# Patient Record
Sex: FEMALE | Race: WHITE | NOT HISPANIC OR LATINO | Employment: FULL TIME | URBAN - METROPOLITAN AREA
[De-identification: names, ages, dates, MRNs, and addresses within clinical notes are randomized per-mention and may not be internally consistent; named-entity substitution may affect disease eponyms.]

---

## 2017-02-17 ENCOUNTER — OFFICE VISIT (OUTPATIENT)
Dept: FAMILY MEDICINE | Facility: CLINIC | Age: 32
End: 2017-02-17
Payer: COMMERCIAL

## 2017-02-17 VITALS
HEIGHT: 65 IN | WEIGHT: 130.3 LBS | BODY MASS INDEX: 21.71 KG/M2 | DIASTOLIC BLOOD PRESSURE: 69 MMHG | HEART RATE: 66 BPM | SYSTOLIC BLOOD PRESSURE: 99 MMHG | TEMPERATURE: 98.8 F

## 2017-02-17 DIAGNOSIS — Z86.59 HISTORY OF ANXIETY: ICD-10-CM

## 2017-02-17 DIAGNOSIS — Z97.5 IUD (INTRAUTERINE DEVICE) IN PLACE: ICD-10-CM

## 2017-02-17 DIAGNOSIS — F41.1 GENERALIZED ANXIETY DISORDER: ICD-10-CM

## 2017-02-17 DIAGNOSIS — Z12.4 SCREENING FOR CERVICAL CANCER: ICD-10-CM

## 2017-02-17 DIAGNOSIS — L70.9 ACNE, UNSPECIFIED ACNE TYPE: ICD-10-CM

## 2017-02-17 DIAGNOSIS — J45.20 MILD INTERMITTENT ASTHMA WITHOUT COMPLICATION: ICD-10-CM

## 2017-02-17 DIAGNOSIS — Z00.00 ROUTINE GENERAL MEDICAL EXAMINATION AT A HEALTH CARE FACILITY: Primary | ICD-10-CM

## 2017-02-17 PROCEDURE — 99395 PREV VISIT EST AGE 18-39: CPT | Performed by: FAMILY MEDICINE

## 2017-02-17 PROCEDURE — 36415 COLL VENOUS BLD VENIPUNCTURE: CPT | Performed by: FAMILY MEDICINE

## 2017-02-17 PROCEDURE — 80048 BASIC METABOLIC PNL TOTAL CA: CPT | Performed by: FAMILY MEDICINE

## 2017-02-17 PROCEDURE — G0145 SCR C/V CYTO,THINLAYER,RESCR: HCPCS | Performed by: FAMILY MEDICINE

## 2017-02-17 PROCEDURE — 87624 HPV HI-RISK TYP POOLED RSLT: CPT | Performed by: FAMILY MEDICINE

## 2017-02-17 RX ORDER — SPIRONOLACTONE 25 MG/1
25 TABLET ORAL DAILY
Qty: 30 TABLET | Refills: 11 | Status: SHIPPED | OUTPATIENT
Start: 2017-02-17 | End: 2017-10-12 | Stop reason: SINTOL

## 2017-02-17 RX ORDER — ALBUTEROL SULFATE 90 UG/1
2 AEROSOL, METERED RESPIRATORY (INHALATION) EVERY 6 HOURS PRN
Qty: 1 INHALER | Refills: 11 | Status: SHIPPED | OUTPATIENT
Start: 2017-02-17 | End: 2018-08-13

## 2017-02-17 NOTE — MR AVS SNAPSHOT
After Visit Summary   2/17/2017    Yeni Gaston    MRN: [id number]           Patient Information     Date Of Birth          1985        Visit Information        Provider Department      2/17/2017 4:00 PM Annie Guerrier MD [facility]        Today's Diagnoses     Routine general medical examination at a health care facility    -  1    Mild intermittent asthma without complication        Acne, unspecified acne type        IUD (intrauterine device) in place        Screening for cervical cancer        Generalized anxiety disorder        History of anxiety          Care Instructions    Mild intermittent asthma without complication  Comment: Plan: excercise triggers it  Albuterol four times daily as needed      (L70.9) Acne, unspecified acne type  Comment:   Plan: spirolactone once daily once daily\  BMP.can not continue when / if planning pregnancy      History of anxiety  lexapro helped but loss of libido  Able to cope well without current concerns    (Z97.5) IUD (intrauterine device) in place  Plan: Merina July 2014    Preventive Health Recommendations  Female Ages 26 - 39  Yearly exam:   See your health care provider every year in order to    Review health changes.     Discuss preventive care.      Review your medicines if you your doctor has prescribed any.    Until age 30: Get a Pap test every three years (more often if you have had an abnormal result).    After age 30: Talk to your doctor about whether you should have a Pap test every 3 years or have a Pap test with HPV screening every 5 years.   You do not need a Pap test if your uterus was removed (hysterectomy) and you have not had cancer.  You should be tested each year for STDs (sexually transmitted diseases), if you're at risk.   Talk to your provider about how often to have your cholesterol checked.  If you are at risk for diabetes, you should have a diabetes test (fasting glucose).  Shots: Get a flu shot each year. Get  "a tetanus shot every 10 years.   Nutrition:     Eat at least 5 servings of fruits and vegetables each day.    Eat whole-grain bread, whole-wheat pasta and brown rice instead of white grains and rice.    Talk to your provider about Calcium and Vitamin D.     Lifestyle    Exercise at least 150 minutes a week (30 minutes a day, 5 days of the week). This will help you control your weight and prevent disease.    Limit alcohol to one drink per day.    No smoking.     Wear sunscreen to prevent skin cancer.    See your dentist every six months for an exam and cleaning.          Follow-ups after your visit        Future tests that were ordered for you today     Open Future Orders        Priority Expected Expires Ordered    **Basic metabolic panel FUTURE 14d Routine 2/24/2017 3/3/2017 2/17/2017            Who to contact     If you have questions or need follow up information about today's clinic visit or your schedule please contact [facility] directly at [phone].  Normal or non-critical lab and imaging results will be communicated to you by Drafthart, letter or phone within 4 business days after the clinic has received the results. If you do not hear from us within 7 days, please contact the clinic through Drafthart or phone. If you have a critical or abnormal lab result, we will notify you by phone as soon as possible.  Submit refill requests through aSmallWorld or call your pharmacy and they will forward the refill request to us. Please allow 3 business days for your refill to be completed.          Additional Information About Your Visit        Draftharoroeco Information     aSmallWorld lets you send messages to your doctor, view your test results, renew your prescriptions, schedule appointments and more. To sign up, go to [email or web address] . Click on \"Log in\" on the left side of the screen, which will take you to the Welcome page. Then click on \"Sign up Now\" on the right side of the page.     You will be asked to " "enter the access code listed below, as well as some personal information. Please follow the directions to create your username and password.     Your access code is: [email or web address]  Expires: 2017  4:46 PM     Your access code will  in 90 days. If you need help or a new code, please call your [facility] clinic or [phone].        Care EveryWhere ID     This is your Care EveryWhere ID. This could be used by other organizations to access your [facility] medical records  [id number]        Your Vitals Were     Pulse Temperature Height BMI (Body Mass Index)          66 98.8  F (37.1  C) (Oral) 5' 5\" (1.651 m) 21.68 kg/m2         Blood Pressure from Last 3 Encounters:   17 99/69   14 94/60   14 94/52    Weight from Last 3 Encounters:   17 130 lb 4.8 oz (59.1 kg)   14 128 lb (58.1 kg)   14 128 lb 3.2 oz (58.2 kg)              We Performed the Following     HPV High Risk Types DNA Cervical     Pap imaged thin layer screen with HPV - recommended age 30 - 65 years (select HPV order below)          Today's Medication Changes          These changes are accurate as of: 17  4:46 PM.  If you have any questions, ask your nurse or doctor.               Start taking these medicines.        Dose/Directions    albuterol 108 (90 BASE) MCG/ACT Inhaler   Commonly known as:  PROAIR HFA/PROVENTIL HFA/VENTOLIN HFA   Used for:  Mild intermittent asthma without complication   Started by:  Annie Guerrier MD        Dose:  2 puff   Inhale 2 puffs into the lungs every 6 hours as needed for shortness of breath / dyspnea or wheezing   Quantity:  1 Inhaler   Refills:  11       spironolactone 25 MG tablet   Commonly known as:  ALDACTONE   Used for:  Acne, unspecified acne type   Started by:  Annie Guerrier MD        Dose:  25 mg   Take 1 tablet (25 mg) by mouth daily   Quantity:  30 tablet   Refills:  11            Where to get your medicines      These medications were sent to [facility] [address] [place] " [facility] - [place], [place] - [address] [facility]  [address] [facility] [address]     Phone:  [phone]     albuterol 108 (90 BASE) MCG/ACT Inhaler    spironolactone 25 MG tablet                Primary Care Provider Office Phone # Fax #    Annie Candi Guerrier [phone]       [facility] [address] [facility] [address]        Thank you!     Thank you for choosing [facility]  for your care. Our goal is always to provide you with excellent care. Hearing back from our patients is one way we can continue to improve our services. Please take a few minutes to complete the written survey that you may receive in the mail after your visit with us. Thank you!             Your Updated Medication List - Protect others around you: Learn how to safely use, store and throw away your medicines at www.disposemymeds.org.          This list is accurate as of: 2/17/17  4:46 PM.  Always use your most recent med list.                   Brand Name Dispense Instructions for use    albuterol 108 (90 BASE) MCG/ACT Inhaler    PROAIR HFA/PROVENTIL HFA/VENTOLIN HFA    1 Inhaler    Inhale 2 puffs into the lungs every 6 hours as needed for shortness of breath / dyspnea or wheezing       escitalopram 10 MG tablet    LEXAPRO    90 tablet    Take 1 tablet (10 mg) by mouth daily       Fish Oil Oil      daily.       MIRENA (52 MG) 20 MCG/24HR IUD   Generic drug:  levonorgestrel      1 each (20 mcg) by Intrauterine route once       multivitamin per tablet      Take 1 tablet by mouth daily.       NASACORT AQ 55 MCG/ACT nasal inhaler   Generic drug:  triamcinolone     1 Inhaler    Spray 2 sprays into both nostrils daily       spironolactone 25 MG tablet    ALDACTONE    30 tablet    Take 1 tablet (25 mg) by mouth daily

## 2017-02-17 NOTE — LETTER
[facility]  [address]  [facility] [address]  [phone]      February 24, 2017    Yeni Gaston  [address]  [place] [address]    Dear Yeni,  We are happy to inform you that your PAP smear result from 02/17/17 is normal.  We are now able to do a follow up test on PAP smears. The DNA test is for HPV (Human Papilloma Virus). Cervical cancer is closely linked with certain types of HPV. Your result showed no evidence of high risk HPV.  Therefore we recommend you return in 3 years for your next pap smear and HPV test.  You will still need to return to the clinic every year for an annual exam and other preventive tests.  Please contact the clinic with any questions.  Sincerely,  Annie Guerrier MD/[facility]

## 2017-02-17 NOTE — NURSING NOTE
"Chief Complaint   Patient presents with     Physical     not fasting     BP 99/69  Pulse 66  Temp 98.8  F (37.1  C) (Oral)  Ht 5' 5\" (1.651 m)  Wt 130 lb 4.8 oz (59.1 kg)  BMI 21.68 kg/m2 Estimated body mass index is 21.68 kg/(m^2) as calculated from the following:    Height as of this encounter: 5' 5\" (1.651 m).    Weight as of this encounter: 130 lb 4.8 oz (59.1 kg).  BP completed using cuff size: regular       Health Maintenance due pending provider review:  Pap Smear due  6/2017    PAP--Possibly completing today, per Provider review      Flory Aparicio CMA  "

## 2017-02-17 NOTE — LETTER
My Asthma Action Plan  Name: Yeni Gaston   YOB: 1985  Date: 2/17/2017   My doctor: Annie Guerrier   My clinic: [facility]      My Control Medicine: not needed        Dose:   My Rescue Medicine: Albuterol (Proair/Ventolin/Proventil) HFA        Dose: prior excercise    My Asthma Severity: intermittent  Avoid your asthma triggers: exercise or sports        GREEN ZONE   Good Control    I feel good    No cough or wheeze    Can work, sleep and play without asthma symptoms       Take your asthma control medicine every day.     1. If exercise triggers your asthma, take your rescue medication    15 minutes before exercise or sports, and    During exercise if you have asthma symptoms  2. Spacer to use with inhaler: If you have a spacer, make sure to use it with your inhaler             YELLOW ZONE Getting Worse  I have ANY of these:    I do not feel good    Cough or wheeze    Chest feels tight    Wake up at night   1. Keep taking your Green Zone medications  2. Start taking your rescue medicine:    every 20 minutes for up to 1 hour. Then every 4 hours for 24-48 hours.  3. If you stay in the Yellow Zone for more than 12-24 hours, contact your doctor.  4. If you do not return to the Green Zone in 12-24 hours or you get worse, start taking your oral steroid medicine if prescribed by your provider.           RED ZONE Medical Alert - Get Help  I have ANY of these:    I feel awful    Medicine is not helping    Breathing getting harder    Trouble walking or talking    Nose opens wide to breathe       1. Take your rescue medicine NOW  2. If your provider has prescribed an oral steroid medicine, start taking it NOW  3. Call your doctor NOW  4. If you are still in the Red Zone after 20 minutes and you have not reached your doctor:    Take your rescue medicine again and    Call 911 or go to the emergency room right away    See your regular doctor within 2 weeks of an Emergency Room or Urgent Care  visit for follow-up treatment.        The above medication may be given at school or day care?: N/A (Adult Patient)  Child can carry and use inhaler(s) at school with approval of school nurse?: N/A (Adult Patient)    Electronically signed by: Annie Guerrier, February 17, 2017    Annual Reminders:  Meet with Asthma Educator,  Flu Shot in the Fall, consider Pneumonia Vaccination for patients with asthma (aged 19 and older).    Pharmacy:    [id number] - [place], [place] - [address]  [facility] FAX ONLY - [place], [place] [facility] - [place], [place] - [address]                    Asthma Triggers  How To Control Things That Make Your Asthma Worse    Triggers are things that make your asthma worse.  Look at the list below to help you find your triggers and what you can do about them.  You can help prevent asthma flare-ups by staying away from your triggers.      Trigger                                                          What you can do   Cigarette Smoke  Tobacco smoke can make asthma worse. Do not allow smoking in your home, car or around you.  Be sure no one smokes at a child s day care or school.  If you smoke, ask your health care provider for ways to help you quit.  Ask family members to quit too.  Ask your health care provider for a referral to Quit Plan to help you quit smoking, or call 4-405-378-PLAN.     Colds, Flu, Bronchitis  These are common triggers of asthma. Wash your hands often.  Don t touch your eyes, nose or mouth.  Get a flu shot every year.     Dust Mites  These are tiny bugs that live in cloth or carpet. They are too small to see. Wash sheets and blankets in hot water every week.   Encase pillows and mattress in dust mite proof covers.  Avoid having carpet if you can. If you have carpet, vacuum weekly.   Use a dust mask and HEPA vacuum.   Pollen and Outdoor Mold  Some people are allergic to trees, grass, or weed pollen,  or molds. Try to keep your windows closed.  Limit time out doors when pollen count is high.   Ask you health care provider about taking medicine during allergy season.     Animal Dander  Some people are allergic to skin flakes, urine or saliva from pets with fur or feathers. Keep pets with fur or feathers out of your home.    If you can t keep the pet outdoors, then keep the pet out of your bedroom.  Keep the bedroom door closed.  Keep pets off cloth furniture and away from stuffed toys.     Mice, Rats, and Cockroaches  Some people are allergic to the waste from these pests.   Cover food and garbage.  Clean up spills and food crumbs.  Store grease in the refrigerator.   Keep food out of the bedroom.   Indoor Mold  This can be a trigger if your home has high moisture. Fix leaking faucets, pipes, or other sources of water.   Clean moldy surfaces.  Dehumidify basement if it is damp and smelly.   Smoke, Strong Odors, and Sprays  These can reduce air quality. Stay away from strong odors and sprays, such as perfume, powder, hair spray, paints, smoke incense, paint, cleaning products, candles and new carpet.   Exercise or Sports  Some people with asthma have this trigger. Be active!  Ask your doctor about taking medicine before sports or exercise to prevent symptoms.    Warm up for 5-10 minutes before and after sports or exercise.     Other Triggers of Asthma  Cold air:  Cover your nose and mouth with a scarf.  Sometimes laughing or crying can be a trigger.  Some medicines and food can trigger asthma.

## 2017-02-17 NOTE — PATIENT INSTRUCTIONS
Mild intermittent asthma without complication  Comment: Plan: excercise triggers it  Albuterol four times daily as needed      (L70.9) Acne, unspecified acne type  Comment:   Plan: spirolactone once daily once daily\  BMP.can not continue when / if planning pregnancy      History of anxiety  lexapro helped but loss of libido  Able to cope well without current concerns    (Z97.5) IUD (intrauterine device) in place  Plan: Jamison July 2014    Preventive Health Recommendations  Female Ages 26 - 39  Yearly exam:   See your health care provider every year in order to    Review health changes.     Discuss preventive care.      Review your medicines if you your doctor has prescribed any.    Until age 30: Get a Pap test every three years (more often if you have had an abnormal result).    After age 30: Talk to your doctor about whether you should have a Pap test every 3 years or have a Pap test with HPV screening every 5 years.   You do not need a Pap test if your uterus was removed (hysterectomy) and you have not had cancer.  You should be tested each year for STDs (sexually transmitted diseases), if you're at risk.   Talk to your provider about how often to have your cholesterol checked.  If you are at risk for diabetes, you should have a diabetes test (fasting glucose).  Shots: Get a flu shot each year. Get a tetanus shot every 10 years.   Nutrition:     Eat at least 5 servings of fruits and vegetables each day.    Eat whole-grain bread, whole-wheat pasta and brown rice instead of white grains and rice.    Talk to your provider about Calcium and Vitamin D.     Lifestyle    Exercise at least 150 minutes a week (30 minutes a day, 5 days of the week). This will help you control your weight and prevent disease.    Limit alcohol to one drink per day.    No smoking.     Wear sunscreen to prevent skin cancer.    See your dentist every six months for an exam and cleaning.

## 2017-02-17 NOTE — PROGRESS NOTES
SUBJECTIVE:     CC: Yeni Gaston is an 31 year old woman who presents for preventive health visit.     Milk /cheese- constipated and while consuming the products stabbing knives  She is wondering if now lactose intolerance due to IUD-  meirina or 2 are unreslted  Acne since merina, breaks out in chin area. Local medications- cause sensitivity    Excercise induced asthma  Sister has excercise induced and allergies triggered asthma    Healthy Habits:    Do you get at least three servings of calcium containing foods daily (dairy, green leafy vegetables, etc.)? yes    Amount of exercise or daily activities, outside of work: 3-4 day(s) per week    Problems taking medications regularly No    Medication side effects: No    Have you had an eye exam in the past two years? yes    Do you see a dentist twice per year? yes    Do you have sleep apnea, excessive snoring or daytime drowsiness?no        SOB- when running      Duration: last few years getting worse lately     Description (location/character/radiation): SOB while on treadmill - starts after 5 min     Intensity:  moderate    Accompanying signs and symptoms: SOB, face gets tingly and twitches , ears ache and ring      History (similar episodes/previous evaluation): None    Precipitating or alleviating factors: None    Therapies tried and outcome: not really          Today's PHQ-2 Score:   PHQ-2 ( 1999 Pfizer) 2/17/2017 7/16/2014   Q1: Little interest or pleasure in doing things 0 0   Q2: Feeling down, depressed or hopeless 0 0   PHQ-2 Score 0 0       Abuse: Current or Past(Physical, Sexual or Emotional)- No  Do you feel safe in your environment - Yes    Social History   Substance Use Topics     Smoking status: Never Smoker     Smokeless tobacco: Never Used     Alcohol use Yes      Comment: rarely     The patient does not drink >3 drinks per day nor >7 drinks per week.    No results for input(s): CHOL, HDL, LDL, TRIG, CHOLHDLRATIO, NHDL in the last 12158  "hours.    Reviewed orders with patient.  Reviewed health maintenance and updated orders accordingly - Yes    Mammo Decision Support:  Mammogram not appropriate for this patient based on age.    Pertinent mammograms are reviewed under the imaging tab.  History of abnormal Pap smear: NO - age 30- 65 PAP every 3 years recommended  All Histories reviewed and updated in Epic.      ROS: situational stress prior to wedding  Weaned herself off of medications  Denies any on going anxiety- does not like medications  For longetrm  lexapro helped with anxiety but loss of libdio was a big side effects. Does not think anxiety is current concerb  C: NEGATIVE for fever, chills, change in weight  I: NEGATIVE for worrisome rashes, moles or lesions  E: NEGATIVE for vision changes or irritation  ENT: NEGATIVE for ear, mouth and throat problems  R: NEGATIVE for significant cough or SOB  B: NEGATIVE for masses, tenderness or discharge  CV: NEGATIVE for chest pain, palpitations or peripheral edema  GI: NEGATIVE for nausea, abdominal pain, heartburn, or change in bowel habits  : NEGATIVE for unusual urinary or vaginal symptoms. Periods are regular.  M: NEGATIVE for significant arthralgias or myalgia  N: NEGATIVE for weakness, dizziness or paresthesias  P: NEGATIVE for changes in mood or affect    Problem list, Medication list, Allergies, and Medical/Social/Surgical histories reviewed in [facility] and updated as appropriate.  OBJECTIVE:     BP 99/69  Pulse 66  Temp 98.8  F (37.1  C) (Oral)  Ht 5' 5\" (1.651 m)  Wt 130 lb 4.8 oz (59.1 kg)  BMI 21.68 kg/m2  EXAM:   Wt Readings from Last 5 Encounters:   02/17/17 130 lb 4.8 oz (59.1 kg)   07/28/14 128 lb (58.1 kg)   07/16/14 128 lb 3.2 oz (58.2 kg)   06/17/14 134 lb 14.4 oz (61.2 kg)   06/11/14 132 lb 14.4 oz (60.3 kg)     GENERAL: healthy, alert and no distress  EYES: Eyes grossly normal to inspection, PERRL and conjunctivae and sclerae normal  HENT: ear canals and TM's normal, nose and mouth " without ulcers or lesions  NECK: no adenopathy, no asymmetry, masses, or scars and thyroid normal to palpation  RESP: lungs clear to auscultation - no rales, rhonchi or wheezes  BREAST: normal without masses, tenderness or nipple discharge and no palpable axillary masses or adenopathy  CV: regular rate and rhythm, normal S1 S2, no S3 or S4, no murmur, click or rub, no peripheral edema and peripheral pulses strong  ABDOMEN: soft, nontender, no hepatosplenomegaly, no masses and bowel sounds normal   (female): normal female external genitalia, normal urethral meatus, vaginal mucosa pink, moist, well rugated, and normal cervix/adnexa/uterus without masses or discharge. IUD in place  MS: no gross musculoskeletal defects noted, no edema  SKIN: no suspicious lesions or rashes  NEURO: Normal strength and tone, mentation intact and speech normal  PSYCH: mentation appears normal, affect normal/bright    ASSESSMENT/PLAN:     (Z00.00) Routine general medical examination at a health care facility  (primary encounter diagnosis)  Plan: Please see patient instructions     (J45.20) Mild intermittent asthma without complication  Comment: Plan: excercise triggers it  Albuterol four times daily as needed      (L70.9) Acne, unspecified acne type  Comment:   Plan: spirolactone once daily once daily\  BMP.  can not continue when / if planning pregnancy  Potential medication side effects were discussed with the patient; let me know if any occur.        History of anxiety  lexapro helped but loss of libido  Able to cope well without current concerns    (Z97.5) IUD (intrauterine device) in place  Plan: Jamison July 2014    (Z12.4) Screening for cervical cancer  Plan: HPV High Risk Types DNA Cervical, Pap imaged         thin layer screen with HPV - recommended age 30        - 65 years (select HPV order below)              COUNSELING:   Reviewed preventive health counseling, as reflected in patient instructions       Regular exercise        "Healthy diet/nutrition         reports that she has never smoked. She has never used smokeless tobacco.    Estimated body mass index is 21.68 kg/(m^2) as calculated from the following:    Height as of this encounter: 5' 5\" (1.651 m).    Weight as of this encounter: 130 lb 4.8 oz (59.1 kg).       Counseling Resources:  ATP IV Guidelines  Pooled Cohorts Equation Calculator  Breast Cancer Risk Calculator  FRAX Risk Assessment  ICSI Preventive Guidelines  Dietary Guidelines for Americans, 2010  USDA's MyPlate  ASA Prophylaxis  Lung CA Screening    Annie Guerrier MD  [facility]  "

## 2017-02-20 LAB
ANION GAP SERPL CALCULATED.3IONS-SCNC: 10 MMOL/L (ref 3–14)
BUN SERPL-MCNC: 10 MG/DL (ref 7–30)
CALCIUM SERPL-MCNC: 9.5 MG/DL (ref 8.5–10.1)
CHLORIDE SERPL-SCNC: 105 MMOL/L (ref 94–109)
CO2 SERPL-SCNC: 23 MMOL/L (ref 20–32)
CREAT SERPL-MCNC: 0.69 MG/DL (ref 0.52–1.04)
GFR SERPL CREATININE-BSD FRML MDRD: NORMAL ML/MIN/1.7M2
GLUCOSE SERPL-MCNC: 77 MG/DL (ref 70–99)
POTASSIUM SERPL-SCNC: 4.1 MMOL/L (ref 3.4–5.3)
SODIUM SERPL-SCNC: 138 MMOL/L (ref 133–144)

## 2017-02-22 LAB
COPATH REPORT: NORMAL
PAP: NORMAL

## 2017-02-24 LAB
FINAL DIAGNOSIS: NORMAL
HPV HR 12 DNA CVX QL NAA+PROBE: NEGATIVE
HPV16 DNA SPEC QL NAA+PROBE: NEGATIVE
HPV18 DNA SPEC QL NAA+PROBE: NEGATIVE
SPECIMEN DESCRIPTION: NORMAL

## 2017-02-27 NOTE — PROGRESS NOTES
Send lab & letter    -Kidney function is normal (Cr, GFR), Sodium is normal, Potassium is normal, Calcium is normal, Glucose is normal (diabetes screening test).     Please keep us posted with questions or concerns .      Best Regards,    Annie Guerrier MD  [facility]  [phone]    ;

## 2017-06-06 ENCOUNTER — VIRTUAL VISIT (OUTPATIENT)
Dept: FAMILY MEDICINE | Facility: OTHER | Age: 32
End: 2017-06-06

## 2017-06-06 NOTE — PROGRESS NOTES
"Date:   Clinician: Lorna Burns  Clinician NPI: [id number]  Patient: Yeni West  Patient : 1985  Patient Address: [address] [place] [address]  Patient Phone: [phone]  Visit Protocol: UTI  Patient Summary:  Yeni is a 31 year old ( : 1985 ) female who initiated a Visit for a presumed bladder infection. When asked the question \"Please sign me up to receive news, health information and promotions. \", Yeni responded \"No\".   A synchronous visit is necessary because the patient reported the following abnormal symptoms:   She has used antibiotics for her current symptoms    Her symptoms began 2 days ago and consist of urinary frequency, foul smelling urine, urgency, and dysuria.   Symptom Details   Urinary Frequency: Several times each hour    She denies flank pain, hematuria, vaginal discharge, abdominal pain, recent antibiotic use, nausea, loss of appetite, chills, fever, urinary incontinence, hesitation, and vomiting. Yeni has never had kidney stones. She has not been hospitalized, been a patient in a nursing home, or had a catheter in the past two weeks. She denies risk factors for sexually transmitted infections.   Yeni has had one (1) UTI in the past 12 months. Her most recent bladder infection was not within the last 4 weeks. Her current symptoms are similar to the previous UTI symptoms. She took TMP/Sulfa for her last infection and found it to be effective.    Yeni typically gets yeast infections when she takes antibiotics.  She states she is not pregnant and denies breastfeeding. She no longer menstruates.   She does NOT smoke or use smokeless tobacco.  MEDICATIONS:  No current medications , ALLERGIES:  NKDA   Clinician Response:  Dear Yeni,  Based on the information you have provided, you likely have a bladder infection, also called an acute urinary tract infection (UTI).   To treat your infection, I am prescribing:   Bactrim DS. Swallow one (1) tablet " twice a day for 3 days to treat your bladder infection. Continue taking the tablets even if you feel better before all the medication is gone. There is no refill with this prescription.   Antibiotic selections by the provider are based on safety and effectiveness. You may or may not be prescribed the same medication that you took for your last bladder infection.   Some people develop allergies to antibiotics. If you notice a new rash, significant swelling, or difficulty breathing, stop the medication immediately and go into a clinic for physical evaluation.   To help treat your current UTI and prevent future occurrences, remember to:     Drink 8-10, 8-ounce glasses of water daily.    Urinate after sexual intercourse.    Wipe front to back after using the bathroom.     Some women may develop a yeast infection as a side effect of taking antibiotics. Because you noted that you typically get yeast infections when you are taking antibiotics, I am also prescribing:   Fluconazole (Diflucan) 150 mg oral tablet. Take this medication only if you notice symptoms of a yeast infection (vaginal discharge that is white, thick, and odorless). There are no refills with this prescription.   You should visit a clinic for a follow-up visit if your symptoms do not improve in 1-2 days or if you experience another urinary tract infection soon after completing this treatment.  If you become pregnant during this course of treatment, stop taking the medication and contact your primary care provider.   Diagnosis: Acute Uncomplicated Bladder Infection  Diagnosis ICD: N39.0  Triage Notes: Pt name and  verified. Patient states she is not currently on antibiotics and has not been on antibiotics for a long time.   Synchronous Triage: phone, status: completed, duration: 124 seconds  Prescription: sulfamethoxazole-TMP DS (Bactrim DS) 800-160mg oral tablet 6 tablets, 3 days supply. Take one tablet by mouth two times a day for 3 days. Refills: 0,  Refill as needed: no, Allow substitutions: yes  Prescription: fluconazole (Diflucan) 150mg oral tablet 1 tablet, 1 days supply. Take one tablet by mouth one time a day for 1 day. Refills: 0, Refill as needed: no, Allow substitutions: yes  Pharmacy: [facility] [id number] [facility] - [phone] - [address], [place] [address]

## 2017-10-12 ENCOUNTER — OFFICE VISIT (OUTPATIENT)
Dept: FAMILY MEDICINE | Facility: CLINIC | Age: 32
End: 2017-10-12
Payer: COMMERCIAL

## 2017-10-12 VITALS
WEIGHT: 124.2 LBS | DIASTOLIC BLOOD PRESSURE: 68 MMHG | HEART RATE: 100 BPM | TEMPERATURE: 98.8 F | BODY MASS INDEX: 20.69 KG/M2 | RESPIRATION RATE: 16 BRPM | OXYGEN SATURATION: 99 % | HEIGHT: 65 IN | SYSTOLIC BLOOD PRESSURE: 96 MMHG

## 2017-10-12 DIAGNOSIS — T78.40XA ALLERGIC STATE, INITIAL ENCOUNTER: ICD-10-CM

## 2017-10-12 DIAGNOSIS — J01.01 ACUTE RECURRENT MAXILLARY SINUSITIS: Primary | ICD-10-CM

## 2017-10-12 PROCEDURE — 99213 OFFICE O/P EST LOW 20 MIN: CPT | Performed by: PHYSICIAN ASSISTANT

## 2017-10-12 RX ORDER — TRIAMCINOLONE ACETONIDE 55 UG/1
2 SPRAY, METERED NASAL DAILY
Qty: 1 BOTTLE | Refills: 11 | Status: ON HOLD | OUTPATIENT
Start: 2017-10-12 | End: 2020-09-08

## 2017-10-12 NOTE — NURSING NOTE
"Chief Complaint   Patient presents with     Sinus Problem     x 5 days     BP 96/68  Pulse 100  Temp 98.8  F (37.1  C) (Oral)  Resp 16  Ht 5' 5\" (1.651 m)  Wt 124 lb 3.2 oz (56.3 kg)  SpO2 99%  BMI 20.67 kg/m2 Estimated body mass index is 20.67 kg/(m^2) as calculated from the following:    Height as of this encounter: 5' 5\" (1.651 m).    Weight as of this encounter: 124 lb 3.2 oz (56.3 kg).  Medication Reconciliation: complete      Health Maintenance due pending provider review:  NONE    n/a    Susanne Peter CMA  "

## 2017-10-12 NOTE — MR AVS SNAPSHOT
After Visit Summary   10/12/2017    Yeni Gaston    MRN: [id number]           Patient Information     Date Of Birth          1985        Visit Information        Provider Department      10/12/2017 8:20 AM Pieter Augustin PA-C [facility]        Today's Diagnoses     Acute recurrent maxillary sinusitis    -  1    Allergic state, initial encounter           Follow-ups after your visit        Additional Services     ALLERGY/ASTHMA ADULT REFERRAL       Your provider has referred you to: [facility]: [facility] [phone]   [email or web address]    Please be aware that coverage of these services is subject to the terms and limitations of your health insurance plan.  Call member services at your health plan with any benefit or coverage questions.      Please bring the following with you to your appointment:    (1) Any X-Rays, CTs or MRIs which have been performed.  Contact the facility where they were done to arrange for  prior to your scheduled appointment.    (2) List of current medications  (3) This referral request   (4) Any documents/labs given to you for this referral                  Who to contact     If you have questions or need follow up information about today's clinic visit or your schedule please contact [facility] directly at [phone].  Normal or non-critical lab and imaging results will be communicated to you by MyChart, letter or phone within 4 business days after the clinic has received the results. If you do not hear from us within 7 days, please contact the clinic through MyChart or phone. If you have a critical or abnormal lab result, we will notify you by phone as soon as possible.  Submit refill requests through [employer] or call your pharmacy and they will forward the refill request to us. Please allow 3 business days for your refill to be completed.          Additional Information About  "Your Visit        [employer] Information     goTenna lets you send messages to your doctor, view your test results, renew your prescriptions, schedule appointments and more. To sign up, go to [email or web address] . Click on \"Log in\" on the left side of the screen, which will take you to the Welcome page. Then click on \"Sign up Now\" on the right side of the page.     You will be asked to enter the access code listed below, as well as some personal information. Please follow the directions to create your username and password.     Your access code is: [email or web address]  Expires: 1/10/2018  8:37 AM     Your access code will  in 90 days. If you need help or a new code, please call your [facility] clinic or [phone].        Care EveryWhere ID     This is your Care EveryWhere ID. This could be used by other organizations to access your [facility] medical records  [id number]        Your Vitals Were     Pulse Temperature Respirations Height Pulse Oximetry BMI (Body Mass Index)    100 98.8  F (37.1  C) (Oral) 16 5' 5\" (1.651 m) 99% 20.67 kg/m2       Blood Pressure from Last 3 Encounters:   10/12/17 96/68   17 99/69   14 94/60    Weight from Last 3 Encounters:   10/12/17 124 lb 3.2 oz (56.3 kg)   17 130 lb 4.8 oz (59.1 kg)   14 128 lb (58.1 kg)              We Performed the Following     ALLERGY/ASTHMA ADULT REFERRAL          Today's Medication Changes          These changes are accurate as of: 10/12/17  8:37 AM.  If you have any questions, ask your nurse or doctor.               Start taking these medicines.        Dose/Directions    amoxicillin-clavulanate 875-125 MG per tablet   Commonly known as:  AUGMENTIN   Used for:  Acute recurrent maxillary sinusitis   Started by:  Pieter Augustin PA-C        Dose:  1 tablet   Take 1 tablet by mouth 2 times daily   Quantity:  20 tablet   Refills:  0         These medicines have changed or have updated prescriptions.        Dose/Directions    * " NASACORT AQ 55 MCG/ACT nasal inhaler   This may have changed:  Another medication with the same name was added. Make sure you understand how and when to take each.   Generic drug:  triamcinolone   Changed by:  Annie Guerrier MD        Dose:  2 spray   Spray 2 sprays into both nostrils daily   Quantity:  1 Inhaler   Refills:  11       * triamcinolone 55 MCG/ACT Inhaler   Commonly known as:  NASACORT AQ   This may have changed:  You were already taking a medication with the same name, and this prescription was added. Make sure you understand how and when to take each.   Used for:  Allergic state, initial encounter   Changed by:  Pieter Augustin PA-C        Dose:  2 spray   Spray 2 sprays into both nostrils daily   Quantity:  1 Bottle   Refills:  11       * Notice:  This list has 2 medication(s) that are the same as other medications prescribed for you. Read the directions carefully, and ask your doctor or other care provider to review them with you.      Stop taking these medicines if you haven't already. Please contact your care team if you have questions.     spironolactone 25 MG tablet   Commonly known as:  ALDACTONE   Stopped by:  Pieter Augustin PA-C                Where to get your medicines      These medications were sent to [facility] [address] [place] - [place], [place] - [address]  [address] [facility] [address]     Phone:  [phone]     triamcinolone 55 MCG/ACT Inhaler         Some of these will need a paper prescription and others can be bought over the counter.  Ask your nurse if you have questions.     Bring a paper prescription for each of these medications     amoxicillin-clavulanate 875-125 MG per tablet                Primary Care Provider Office Phone # Fax #    Annie Guerrier [phone] [address] [facility] [address]        Equal Access to Services     YULIANA DONALD AH: jesus López qaybta  arianna alcocer chiragaristeo dora patterson ah. [facility] [phone].    ATENCIÓN: Si layne saldivar, tiene a berry disposición servicios gratuitos de asistencia lingüística. Jacque al [phone].    We comply with applicable federal civil rights laws and [place] laws. We do not discriminate on the basis of race, color, national origin, age, disability, sex, sexual orientation, or gender identity.            Thank you!     Thank you for choosing [facility]  for your care. Our goal is always to provide you with excellent care. Hearing back from our patients is one way we can continue to improve our services. Please take a few minutes to complete the written survey that you may receive in the mail after your visit with us. Thank you!             Your Updated Medication List - Protect others around you: Learn how to safely use, store and throw away your medicines at www.disposemymeds.org.          This list is accurate as of: 10/12/17  8:37 AM.  Always use your most recent med list.                   Brand Name Dispense Instructions for use Diagnosis    albuterol 108 (90 BASE) MCG/ACT Inhaler    PROAIR HFA/PROVENTIL HFA/VENTOLIN HFA    1 Inhaler    Inhale 2 puffs into the lungs every 6 hours as needed for shortness of breath / dyspnea or wheezing    Mild intermittent asthma without complication       amoxicillin-clavulanate 875-125 MG per tablet    AUGMENTIN    20 tablet    Take 1 tablet by mouth 2 times daily    Acute recurrent maxillary sinusitis       Fish Oil Oil      daily.        MIRENA (52 MG) 20 MCG/24HR IUD   Generic drug:  levonorgestrel      1 each (20 mcg) by Intrauterine route once        multivitamin per tablet      Take 1 tablet by mouth daily.        * NASACORT AQ 55 MCG/ACT nasal inhaler   Generic drug:  triamcinolone     1 Inhaler    Spray 2 sprays into both nostrils daily        * triamcinolone 55 MCG/ACT Inhaler    NASACORT AQ    1 Bottle    Spray 2 sprays into both  nostrils daily    Allergic state, initial encounter       * Notice:  This list has 2 medication(s) that are the same as other medications prescribed for you. Read the directions carefully, and ask your doctor or other care provider to review them with you.

## 2017-10-12 NOTE — PROGRESS NOTES
"  SUBJECTIVE:   Yeni Gaston is a 31 year old female who presents to clinic today for the following health issues:      RESPIRATORY SYMPTOMS      Duration: 5 days    Description  nasal congestion, rhinorrhea, sore throat, facial pain/pressure, cough, chills, ear pain both, headache, fatigue/malaise and stomach ache    Severity: moderate    Accompanying signs and symptoms: None    History (predisposing factors):  none    Precipitating or alleviating factors: worse at night when laying down  Therapies tried and outcome:  rest and fluids oral decongestant antihistamine, with limited improvement          Problem list and histories reviewed & adjusted, as indicated.  Additional history: 32 y/o female c/o not feeling well for the last 5 days.  Admits to the above symptoms with sinus congestion being the worst. Describes as sinus infection mixed with flu.  She is starting to feel [place] today.      She has long history or allergies and recurrent sinus infections.  She has never had allergy testing, but she is interested.    BP Readings from Last 3 Encounters:   10/12/17 96/68   02/17/17 99/69   07/28/14 94/60    Wt Readings from Last 3 Encounters:   10/12/17 124 lb 3.2 oz (56.3 kg)   02/17/17 130 lb 4.8 oz (59.1 kg)   07/28/14 128 lb (58.1 kg)                      Reviewed and updated as needed this visit by clinical staffAllergies       Reviewed and updated as needed this visit by Provider         ROS:  Constitutional, HEENT, cardiovascular, pulmonary, gi and gu systems are negative, except as otherwise noted.      OBJECTIVE:   BP 96/68  Pulse 100  Temp 98.8  F (37.1  C) (Oral)  Resp 16  Ht 5' 5\" (1.651 m)  Wt 124 lb 3.2 oz (56.3 kg)  SpO2 99%  BMI 20.67 kg/m2  Body mass index is 20.67 kg/(m^2).  GENERAL: alert and no distress  EYES: Eyes grossly normal to inspection  HENT: b/l TM dullness with loss of light reflux nasal mucosa is erythematous and edematous   NECK: no adenopathy, no asymmetry, masses, or scars and " thyroid normal to palpation  RESP: lungs clear to auscultation - no rales, rhonchi or wheezes  CV: regular rate and rhythm, normal S1 S2, no S3 or S4, no murmur, click or rub, no peripheral edema and peripheral pulses strong  PSYCH: mentation appears normal, affect normal/bright    Diagnostic Test Results:  none     ASSESSMENT/PLAN:             1. Acute recurrent maxillary sinusitis  This does seem to be improving, so we both dont anticipate needing antibiotic.  Will keep on hand in case symptoms start to get worse over the weekend  - amoxicillin-clavulanate (AUGMENTIN) 875-125 MG per tablet; Take 1 tablet by mouth 2 times daily  Dispense: 20 tablet; Refill: 0    2. Allergic state, initial encounter    - triamcinolone (NASACORT AQ) 55 MCG/ACT Inhaler; Spray 2 sprays into both nostrils daily  Dispense: 1 Bottle; Refill: 11  - ALLERGY/ASTHMA ADULT REFERRAL        Pieter Augustin PA-C  [facility]

## 2017-12-22 ENCOUNTER — VIRTUAL VISIT (OUTPATIENT)
Dept: FAMILY MEDICINE | Facility: OTHER | Age: 32
End: 2017-12-22

## 2017-12-22 NOTE — PROGRESS NOTES
"Date:   Clinician: Reji Eisenberg  Clinician NPI: [id number]  Patient: Yeni Gaston  Patient : 1985  Patient Address: [address] [place] [address]  Patient Phone: [phone]  Visit Protocol: UTI  Patient Summary:  Yeni is a 32 year old ( : 1985 ) female who initiated a Visit for a presumed bladder infection. When asked the question \"Please sign me up to receive news, health information and promotions. \", Yeni responded \"No\".    Her symptoms began 2 days ago and consist of dysuria, urinary frequency, foul smelling urine, and urgency.   Symptom Details   Urinary Frequency: Every 2-3 hours    She denies abdominal pain, vomiting, hematuria, urinary incontinence, loss of appetite, chills, vaginal discharge, flank pain, nausea, recent antibiotic use, hesitation, and feeling feverish. Yeni has never had kidney stones. She has not been hospitalized, been a patient in a nursing home, or had a catheter in the past two weeks. She denies risk factors for sexually transmitted infections.   Yeni has had one (1) UTI in the past 12 months. Her most recent bladder infection was not within the last 4 weeks. Her current symptoms are similar to the previous UTI symptoms. She took ciprofloxacin for her last infection and found it to be effective.   She has experienced side effects (upset stomach, vomiting, or diarrhea) from taking Bactrim DS (trimethoprim/sulfamethoxazole).  Yeni typically gets yeast infections when she takes antibiotics.  She denies pregnancy and denies breastfeeding. She does not menstruate.   She does NOT smoke or use smokeless tobacco.  MEDICATIONS:  No current medications   , ALLERGIES:   sulfa (Bactrim/Septra)    Clinician Response:  Dear Yeni,  Based on the information you have provided, you likely have a bladder infection, also called an acute urinary tract infection (UTI).   To treat your infection, I am prescribing:   Nitrofurantoin (Macrobid). Swallow one (1) " tablet twice a day for 5 days. Take the tablet with food. Continue taking the tablets even if you feel better before all the medication is gone. There is no refill with this prescription.   Antibiotic selections by the provider are based on safety and effectiveness. You may or may not be prescribed the same medication that you took for your last bladder infection.   Some people develop allergies to antibiotics. If you notice a new rash, significant swelling, or difficulty breathing, stop the medication immediately and go into a clinic for physical evaluation.   To help treat your current UTI and prevent future occurrences, remember to:     Drink 8-10, 8-ounce glasses of water daily.    Urinate after sexual intercourse.    Wipe front to back after using the bathroom.     Some women may develop a yeast infection as a side effect of taking antibiotics. If you notice symptoms of a yeast infection, [facility] can help treat that condition as well. Simply log in and complete another Visit, which will cover all of the necessary questions to determine the best treatment for you.   You should visit a clinic for a follow-up visit if your symptoms do not improve in 1-2 days or if you experience another urinary tract infection soon after completing this treatment.  If you become pregnant during this course of treatment, stop taking the medication and contact your primary care provider.   Diagnosis: Acute Uncomplicated Bladder Infection  Diagnosis ICD: N39.0  Prescription: nitrofurantoin (Macrobid) 100mg oral tablet 10 tablets, 5 days supply. Take one tablet by mouth two times a day for 5 days. Refills: 0, Refill as needed: no, Allow substitutions: yes  Pharmacy: [place] [id number] [facility] - [phone] [place] [address] [place] [address]

## 2018-04-04 ENCOUNTER — VIRTUAL VISIT (OUTPATIENT)
Dept: FAMILY MEDICINE | Facility: OTHER | Age: 33
End: 2018-04-04

## 2018-04-05 NOTE — PROGRESS NOTES
"Date:   Clinician: Lorna Chacko  Clinician NPI: [id number]  Patient: Yeni Gaston  Patient : 1985  Patient Address: [address] [place] [address]  Patient Phone: [phone]  Visit Protocol: UTI  Patient Summary:  Yeni is a 32 year old ( : 1985 ) female who initiated a Visit for a presumed bladder infection. When asked the question \"Please sign me up to receive news, health information and promotions from [employer].\", Yeni responded \"No\".    Her symptoms began 2 days ago and consist of urgency, dysuria, foul smelling urine, nausea, and urinary frequency.   Symptom Details   Urinary Frequency: Every 2-3 hours    She denies loss of appetite, feeling feverish, hematuria, urinary incontinence, hesitation, vaginal discharge, flank pain, abdominal pain, vomiting, recent antibiotic use, and chills. Yeni has never had kidney stones. She has not been hospitalized, been a patient in a nursing home, or had a catheter in the past two weeks. She denies risk factors for sexually transmitted infections.   Yeni has had one (1) UTI in the past 12 months. Her most recent bladder infection was not within the last 4 weeks. Her current symptoms are similar to the previous UTI symptoms. She took an antibiotic for her last infection but does not remember which one.   She has experienced side effects (upset stomach, vomiting, or diarrhea) from taking Bactrim DS (trimethoprim/sulfamethoxazole).  Yeni typically gets yeast infections when she takes antibiotics.  She denies pregnancy and denies breastfeeding. She does not menstruate.  MEDICATIONS:    Nasacort nasal  , ALLERGIES:     Bactrim    Clinician Response:  Dear Yeni,  Based on the information you have provided, you likely have a bladder infection, also called acute urinary tract infection (UTI).   To treat your infection, I am prescribing:    Nitrofurantoin monohyd/m-cryst (Macrobid) 100 mg oral capsule.. Take 1 capsule by mouth " every 12 hours for 5 days. Take the medication with food. Continue taking the capsules even if you feel better before all of the medication is gone. There are no refills with this prescription.  Some women may develop a yeast infection as a side effect of taking antibiotics. Because you noted that you typically get yeast infections when you are taking antibiotics, I am also prescribing:   Fluconazole (Diflucan) 150 mg oral tablet. Take 1 tablet by mouth 1 time a day for 1 day. Take this medication only if you notice symptoms of a yeast infection (vaginal discharge that is white, thick, and odorless). There are no refills with this prescription.  Some people develop allergies to antibiotics. If you notice a new rash, significant swelling, or difficulty breathing, stop the medication immediately and go into a clinic for physical evaluation.   If you become pregnant during this course of treatment, stop taking the medication and contact your primary care provider.   To help treat your current UTI and prevent future occurrences, remember to:     Drink 8-10, 8-ounce glasses of water daily.    Urinate after sexual intercourse.    Wipe front to back after using the bathroom.     You should visit a clinic for a follow-up visit if your symptoms do not improve in 1-2 days or if you experience another urinary tract infection soon after completing this treatment.   Diagnosis: Acute Uncomplicated Bladder Infection  Diagnosis ICD: N39.0  Prescription: nitrofurantoin monohyd/m-cryst (Macrobid) 100 mg oral capsule 10 capsule, 5 days supply. Take 1 capsule by mouth every 12 hours for 5 days. Refills: 0, Refill as needed: no, Allow substitutions: yes  Prescription: fluconazole (Diflucan) 150 mg oral tablet 1 1 tablet blister pack, 1 days supply. Take 1 tablet by mouth 1 time a day for 1 day. Refills: 0, Refill as needed: no, Allow substitutions: yes  Pharmacy: [facility]/pharmacy #[id number] - [phone] - [address], [place] [address]

## 2018-04-18 ENCOUNTER — OFFICE VISIT (OUTPATIENT)
Dept: FAMILY MEDICINE | Facility: CLINIC | Age: 33
End: 2018-04-18
Payer: COMMERCIAL

## 2018-04-18 VITALS
HEIGHT: 65 IN | OXYGEN SATURATION: 98 % | WEIGHT: 120 LBS | DIASTOLIC BLOOD PRESSURE: 72 MMHG | HEART RATE: 67 BPM | BODY MASS INDEX: 19.99 KG/M2 | TEMPERATURE: 99.2 F | SYSTOLIC BLOOD PRESSURE: 99 MMHG | RESPIRATION RATE: 14 BRPM

## 2018-04-18 DIAGNOSIS — F41.0 ANXIETY ATTACK: ICD-10-CM

## 2018-04-18 DIAGNOSIS — F41.9 ANXIETY: Primary | ICD-10-CM

## 2018-04-18 DIAGNOSIS — F33.1 MODERATE EPISODE OF RECURRENT MAJOR DEPRESSIVE DISORDER (H): ICD-10-CM

## 2018-04-18 PROCEDURE — 99214 OFFICE O/P EST MOD 30 MIN: CPT | Performed by: FAMILY MEDICINE

## 2018-04-18 RX ORDER — VENLAFAXINE HYDROCHLORIDE 75 MG/1
75 CAPSULE, EXTENDED RELEASE ORAL DAILY
Qty: 30 CAPSULE | Refills: 1 | Status: SHIPPED | OUTPATIENT
Start: 2018-04-18 | End: 2018-06-22

## 2018-04-18 RX ORDER — ALPRAZOLAM 0.25 MG
0.25 TABLET ORAL DAILY PRN
Qty: 10 TABLET | Refills: 0 | Status: SHIPPED | OUTPATIENT
Start: 2018-04-18 | End: 2019-06-27

## 2018-04-18 ASSESSMENT — ANXIETY QUESTIONNAIRES
2. NOT BEING ABLE TO STOP OR CONTROL WORRYING: NEARLY EVERY DAY
7. FEELING AFRAID AS IF SOMETHING AWFUL MIGHT HAPPEN: NEARLY EVERY DAY
6. BECOMING EASILY ANNOYED OR IRRITABLE: NEARLY EVERY DAY
5. BEING SO RESTLESS THAT IT IS HARD TO SIT STILL: NEARLY EVERY DAY
1. FEELING NERVOUS, ANXIOUS, OR ON EDGE: NEARLY EVERY DAY
GAD7 TOTAL SCORE: 21
IF YOU CHECKED OFF ANY PROBLEMS ON THIS QUESTIONNAIRE, HOW DIFFICULT HAVE THESE PROBLEMS MADE IT FOR YOU TO DO YOUR WORK, TAKE CARE OF THINGS AT HOME, OR GET ALONG WITH OTHER PEOPLE: EXTREMELY DIFFICULT
3. WORRYING TOO MUCH ABOUT DIFFERENT THINGS: NEARLY EVERY DAY

## 2018-04-18 ASSESSMENT — PATIENT HEALTH QUESTIONNAIRE - PHQ9
10. IF YOU CHECKED OFF ANY PROBLEMS, HOW DIFFICULT HAVE THESE PROBLEMS MADE IT FOR YOU TO DO YOUR WORK, TAKE CARE OF THINGS AT HOME, OR GET ALONG WITH OTHER PEOPLE: EXTREMELY DIFFICULT
5. POOR APPETITE OR OVEREATING: NEARLY EVERY DAY
SUM OF ALL RESPONSES TO PHQ QUESTIONS 1-9: 9
SUM OF ALL RESPONSES TO PHQ QUESTIONS 1-9: 9

## 2018-04-18 NOTE — PATIENT INSTRUCTIONS
Start self care- sveta physical activity of choice- you enjoy running  Start counseling when ready  Start medication- Effexor in am  Follow up in 2 week      Alprazolam only for anxiety attack    Follow up earlier as needed    FMLA if given from  Work- need to be addressed in OV

## 2018-04-18 NOTE — PROGRESS NOTES
SUBJECTIVE:   Yeni Gaston is a 32 year old female who presents to clinic today for the following health issues:    She is crying but consolable   Working at a company-multiple lay off, work load of 3 people on her  Long hours- even if takes time off- the work piles up for her to finish  Feels trapped at work   very supportive, asked her to get more help  Has used marijuana only once to help relieve anxiety attack  Yesterday was unable to get out of her car- due to anxiety  Strong family history of anxiety & depression  She was on lexapro-it helped, stopped after a few month- did feel low libido        Depression and Anxiety Follow-Up    Status since last visit: Worsened work-piled up, had nervous break down at work    Other associated symptoms:can't work out, (uses for therapy to mange symptoms)    Cannot really function at work-feels trapped    Complicating factors: work, mom attempted suicide 2.5 yrs ago- 2-2016    Significant life event: Yes-  Work stress     Current substance abuse: Cannabis    PHQ-9 4/18/2018   Total Score 9   Q9: Suicide Ideation Several days     WILBER-7 SCORE 6/11/2014 7/16/2014 4/18/2018   Total Score 18 2 -   Total Score - -  (minimal anxiety)       PHQ-9  English  PHQ-9   Any Language  WILBER-7  Suicide Assessment Five-step Evaluation and Treatment (SAFE-T)    Amount of exercise or physical activity: None    Problems taking medications regularly: Yes,  problems remembering to take and feels like a zombie, no sexual drive    Medication side effects: none  Diet: regular (no restrictions)-healthy eating      PROBLEMS TO ADD ON...    Problem list and histories reviewed & adjusted, as indicated.  Additional history: as documented    Patient Active Problem List   Diagnosis     Other acne     Other symptoms referable to back     CARDIOVASCULAR SCREENING; LDL GOAL LESS THAN 160     History of anxiety     IUD (intrauterine device) in place     Past Surgical History:   Procedure Laterality  "Date     HC CLOSED TX RAD/ULNAR SHAFT FX W/ MANIP  3/30/90    (R) closed reduction & long arm cast application       Social History   Substance Use Topics     Smoking status: Former Smoker     Quit date: 4/18/2011     Smokeless tobacco: Never Used     Alcohol use Yes      Comment: rarely     Family History   Problem Relation Age of Onset     Depression Mother      Allergies Mother      Circulatory Mother      MENTAL ILLNESS Mother      Respiratory Father      Asthma     Depression Father      Allergies Father      Pacemaker Father      CEREBROVASCULAR DISEASE Father      MENTAL ILLNESS Father      MENTAL ILLNESS Sister      HEART DISEASE Maternal Grandmother      Alcohol/Drug Maternal Grandmother      MENTAL ILLNESS Maternal Grandmother      Genitourinary Problems Maternal Grandfather      Kidney stones     Arthritis Maternal Grandfather      CEREBROVASCULAR DISEASE Maternal Grandfather      MENTAL ILLNESS Maternal Grandfather      Alcohol/Drug Paternal Grandmother      Aneurysm Paternal Grandmother      MENTAL ILLNESS Paternal Grandmother      MENTAL ILLNESS Paternal Grandfather      Allergies Sister      MENTAL ILLNESS Sister      Aneurysm Paternal Uncle            Reviewed and updated as needed this visit by clinical staff       Reviewed and updated as needed this visit by Provider         ROS:  Constitutional, HEENT, cardiovascular, pulmonary, gi and gu systems are negative, except as otherwise noted.    OBJECTIVE:     BP 99/72  Pulse 67  Temp 99.2  F (37.3  C) (Oral)  Resp 14  Ht 5' 5\" (1.651 m)  Wt 120 lb (54.4 kg)  SpO2 98%  Breastfeeding? No  BMI 19.97 kg/m2  Body mass index is 19.97 kg/(m^2).  GENERAL: healthy, alert and no distress  NECK: no adenopathy, no asymmetry, masses, or scars and thyroid normal to palpation  RESP: lungs clear to auscultation - no rales, rhonchi or wheezes  CV: regular rate and rhythm, normal S1 S2, no S3 or S4, no murmur, click or rub, no peripheral edema and peripheral " pulses strong  ABDOMEN: soft, nontender, no hepatosplenomegaly, no masses and bowel sounds normal  MS: no gross musculoskeletal defects noted, no edema  PSYCH: mentation appears normal, affect normal/bright    Diagnostic Test Results:    ASSESSMENT/PLAN:     1. Anxiety  -recurring anxiety & depression   -selective serotonin reuptake inhibitor /lexapro helped -but caused low libido  -will start SNRI  - venlafaxine (EFFEXOR-XR) 75 MG 24 hr capsule; Take 1 capsule (75 mg) by mouth daily  Dispense: 30 capsule; Refill: 1  -we also discussed self care, she like to run- has plans to resume that with imprved weather  -counseling encouraged as well  -follow up in 4 weeks, earlier if concerns  2. Moderate episode of recurrent major depressive disorder (H)  -as above  - venlafaxine (EFFEXOR-XR) 75 MG 24 hr capsule; Take 1 capsule (75 mg) by mouth daily  Dispense: 30 capsule; Refill: 1    3. Anxiety attack    - ALPRAZolam (XANAX) 0.25 MG tablet; Take 1 tablet (0.25 mg) by mouth daily as needed for anxiety  Dispense: 10 tablet; Refill: 0  -use only as needed     -not intended for long term, & no refills  - excused from work for 3 days for self care  -if further forms-from work, need OV     Potential medication side effects were discussed with the patient; let me know if any occur.  The patient indicates understanding of these issues and agrees with the plan.    Annie Guerrier MD  [facility]    Total time with patient today was 25 minutes, more than 15 minutes spent in counseling regarding interpretation of clinical signs and symptoms and going through differentials,additional diagnostic testing options,treatment plan and follow up recommendation.     Answers for HPI/ROS submitted by the patient on 4/18/2018   If you checked off any problems, how difficult have these problems made it for you to do your work, take care of things at home, or get along with other people?: Extremely difficult  PHQ9 TOTAL SCORE: 9

## 2018-04-18 NOTE — MR AVS SNAPSHOT
"              After Visit Summary   4/18/2018    Yeni Gaston    MRN: [id number]           Patient Information     Date Of Birth          1985        Visit Information        Provider Department      4/18/2018 8:30 AM Annie Guerrier MD [facility]        Today's Diagnoses     Anxiety    -  1    Moderate episode of recurrent major depressive disorder (H)        Anxiety attack          Care Instructions    Start self care- sveta physical activity of choice- you enjoy running  Start counseling when ready  Start medication- Effexor in am  Follow up in 2 week      Alprazolam only for anxiety attack    Follow up earlier as needed    FMLA if given from  Work- need to be addressed in OV          Follow-ups after your visit        Who to contact     If you have questions or need follow up information about today's clinic visit or your schedule please contact [facility] directly at [phone].  Normal or non-critical lab and imaging results will be communicated to you by MyChart, letter or phone within 4 business days after the clinic has received the results. If you do not hear from us within 7 days, please contact the clinic through [employer] or phone. If you have a critical or abnormal lab result, we will notify you by phone as soon as possible.  Submit refill requests through [facility] or call your pharmacy and they will forward the refill request to us. Please allow 3 business days for your refill to be completed.          Additional Information About Your Visit        MyChart Information     [facility] lets you send messages to your doctor, view your test results, renew your prescriptions, schedule appointments and more. To sign up, go to [email or web address] . Click on \"Log in\" on the left side of the screen, which will take you to the Welcome page. Then click on \"Sign up Now\" on the right side of the page.     You will be asked to enter the access code listed below, as well as some " "personal information. Please follow the directions to create your username and password.     Your access code is: [email or web address]  Expires: 2018  9:25 AM     Your access code will  in 90 days. If you need help or a new code, please call your [place] clinic or [phone].        Care EveryWhere ID     This is your Care EveryWhere ID. This could be used by other organizations to access your [place] medical records  [id number]        Your Vitals Were     Pulse Temperature Respirations Height Pulse Oximetry Breastfeeding?    67 99.2  F (37.3  C) (Oral) 14 5' 5\" (1.651 m) 98% No    BMI (Body Mass Index)                   19.97 kg/m2            Blood Pressure from Last 3 Encounters:   18 99/72   10/12/17 96/68   17 99/69    Weight from Last 3 Encounters:   18 120 lb (54.4 kg)   10/12/17 124 lb 3.2 oz (56.3 kg)   17 130 lb 4.8 oz (59.1 kg)              Today, you had the following     No orders found for display         Today's Medication Changes          These changes are accurate as of 18  9:25 AM.  If you have any questions, ask your nurse or doctor.               Start taking these medicines.        Dose/Directions    ALPRAZolam 0.25 MG tablet   Commonly known as:  XANAX   Used for:  Anxiety attack   Started by:  Annie Guerrier MD        Dose:  0.25 mg   Take 1 tablet (0.25 mg) by mouth daily as needed for anxiety   Quantity:  10 tablet   Refills:  0       venlafaxine 75 MG 24 hr capsule   Commonly known as:  EFFEXOR-XR   Used for:  Anxiety, Moderate episode of recurrent major depressive disorder (H)   Started by:  Annie Guerrier MD        Dose:  75 mg   Take 1 capsule (75 mg) by mouth daily   Quantity:  30 capsule   Refills:  1            Where to get your medicines      These medications were sent to [id number] [place], [place] - [address]  [address] [facility] [address]     Phone:  [phone]     venlafaxine 75 MG 24 hr capsule "         Some of these will need a paper prescription and others can be bought over the counter.  Ask your nurse if you have questions.     Bring a paper prescription for each of these medications     ALPRAZolam 0.25 MG tablet                Primary Care Provider Office Phone # Fax #    Annie Candi Guerrier [phone] [address] [facility] [address]        Equal Access to Services     [facility] : Hadii aad ku hadasho Soomaali, waaxda luqadaha, qaybta kaalmada adeegyada, waxay idiin hayaan adeeg zeinab lavirginien . So [facility] [phone].    ATENCIÓN: Si habla espfelix, tiene a berry disposición servicios gratuitos de asistencia lingüística. Llame al [phone].    We comply with applicable federal civil rights laws and [place] laws. We do not discriminate on the basis of race, color, national origin, age, disability, sex, sexual orientation, or gender identity.            Thank you!     Thank you for choosing [facility]  for your care. Our goal is always to provide you with excellent care. Hearing back from our patients is one way we can continue to improve our services. Please take a few minutes to complete the written survey that you may receive in the mail after your visit with us. Thank you!             Your Updated Medication List - Protect others around you: Learn how to safely use, store and throw away your medicines at www.disposemymeds.org.          This list is accurate as of 4/18/18  9:25 AM.  Always use your most recent med list.                   Brand Name Dispense Instructions for use Diagnosis    albuterol 108 (90 Base) MCG/ACT Inhaler    PROAIR HFA/PROVENTIL HFA/VENTOLIN HFA    1 Inhaler    Inhale 2 puffs into the lungs every 6 hours as needed for shortness of breath / dyspnea or wheezing    Mild intermittent asthma without complication       ALPRAZolam 0.25 MG tablet    XANAX    10 tablet    Take 1 tablet (0.25 mg) by mouth daily as needed for anxiety     Anxiety attack       Fish Oil Oil      daily.        MIRENA (52 MG) 20 MCG/24HR IUD   Generic drug:  levonorgestrel      1 each (20 mcg) by Intrauterine route once        multivitamin per tablet      Take 1 tablet by mouth daily.        triamcinolone 55 MCG/ACT Inhaler    NASACORT AQ    1 Bottle    Spray 2 sprays into both nostrils daily    Allergic state, initial encounter       venlafaxine 75 MG 24 hr capsule    EFFEXOR-XR    30 capsule    Take 1 capsule (75 mg) by mouth daily    Anxiety, Moderate episode of recurrent major depressive disorder (H)

## 2018-04-18 NOTE — LETTER
Re: Yeni Gaston  : 1985      Yeni is seen in clinic today  Please excuse from  Work 18-18.          Sincerely    Annie Guerrier MD

## 2018-04-18 NOTE — NURSING NOTE
"Chief Complaint   Patient presents with     Mental Health Problem     HX of anxiety-PHQ 9 and WILBER 7 given       Initial BP 99/72  Pulse 67  Temp 99.2  F (37.3  C) (Oral)  Resp 14  Ht 5' 5\" (1.651 m)  Wt 120 lb (54.4 kg)  SpO2 98%  Breastfeeding? No  BMI 19.97 kg/m2 Estimated body mass index is 19.97 kg/(m^2) as calculated from the following:    Height as of this encounter: 5' 5\" (1.651 m).    Weight as of this encounter: 120 lb (54.4 kg).  BP completed using cuff size: regular    Health Maintenance that is potentially due pending provider review:  Health Maintenance Due   Topic Date Due     HIV SCREEN (SYSTEM ASSIGNED)  11/24/2003     TETANUS IMMUNIZATION (SYSTEM ASSIGNED)  04/01/2008     INFLUENZA VACCINE (1) 09/01/2017         Per provider  "

## 2018-04-19 ASSESSMENT — ANXIETY QUESTIONNAIRES: GAD7 TOTAL SCORE: 21

## 2018-04-19 ASSESSMENT — PATIENT HEALTH QUESTIONNAIRE - PHQ9: SUM OF ALL RESPONSES TO PHQ QUESTIONS 1-9: 9

## 2018-08-04 DIAGNOSIS — F33.1 MODERATE EPISODE OF RECURRENT MAJOR DEPRESSIVE DISORDER (H): ICD-10-CM

## 2018-08-04 DIAGNOSIS — F41.9 ANXIETY: ICD-10-CM

## 2018-08-06 NOTE — TELEPHONE ENCOUNTER
Patient given 1 month supply 6/22  Due for follow up.  Note from 4/18/18 OV:  -follow up in 4 weeks, earlier if concerns    VM left asking patient to call clinic.  Saida Leal RN

## 2018-08-10 RX ORDER — VENLAFAXINE HYDROCHLORIDE 75 MG/1
CAPSULE, EXTENDED RELEASE ORAL
Start: 2018-08-10

## 2018-08-10 NOTE — TELEPHONE ENCOUNTER
Spoke with patient.  Forgot about f/u. Very busy house hunting  Scheduled her for anxiety/MDD follow up on Monday 8/13  States she has 1 week supply left of medication.  Saida Leal RN

## 2018-08-13 ENCOUNTER — OFFICE VISIT (OUTPATIENT)
Dept: FAMILY MEDICINE | Facility: CLINIC | Age: 33
End: 2018-08-13
Payer: COMMERCIAL

## 2018-08-13 VITALS
HEIGHT: 65 IN | TEMPERATURE: 98.5 F | OXYGEN SATURATION: 98 % | HEART RATE: 65 BPM | WEIGHT: 125.7 LBS | DIASTOLIC BLOOD PRESSURE: 52 MMHG | SYSTOLIC BLOOD PRESSURE: 86 MMHG | BODY MASS INDEX: 20.94 KG/M2

## 2018-08-13 DIAGNOSIS — J45.20 MILD INTERMITTENT ASTHMA WITHOUT COMPLICATION: ICD-10-CM

## 2018-08-13 DIAGNOSIS — F41.9 ANXIETY: ICD-10-CM

## 2018-08-13 DIAGNOSIS — F33.1 MODERATE EPISODE OF RECURRENT MAJOR DEPRESSIVE DISORDER (H): ICD-10-CM

## 2018-08-13 PROCEDURE — 99214 OFFICE O/P EST MOD 30 MIN: CPT | Performed by: FAMILY MEDICINE

## 2018-08-13 RX ORDER — VENLAFAXINE HYDROCHLORIDE 150 MG/1
150 CAPSULE, EXTENDED RELEASE ORAL DAILY
Qty: 60 CAPSULE | Refills: 3 | Status: SHIPPED | OUTPATIENT
Start: 2018-08-13 | End: 2019-06-15

## 2018-08-13 RX ORDER — ALBUTEROL SULFATE 90 UG/1
2 AEROSOL, METERED RESPIRATORY (INHALATION) EVERY 6 HOURS PRN
Qty: 1 INHALER | Refills: 11 | Status: SHIPPED | OUTPATIENT
Start: 2018-08-13 | End: 2019-08-12

## 2018-08-13 RX ORDER — VENLAFAXINE HYDROCHLORIDE 75 MG/1
75 CAPSULE, EXTENDED RELEASE ORAL DAILY
Qty: 30 CAPSULE | Refills: 0 | Status: CANCELLED | OUTPATIENT
Start: 2018-08-13

## 2018-08-13 ASSESSMENT — ANXIETY QUESTIONNAIRES
2. NOT BEING ABLE TO STOP OR CONTROL WORRYING: SEVERAL DAYS
5. BEING SO RESTLESS THAT IT IS HARD TO SIT STILL: NOT AT ALL
1. FEELING NERVOUS, ANXIOUS, OR ON EDGE: SEVERAL DAYS
6. BECOMING EASILY ANNOYED OR IRRITABLE: SEVERAL DAYS
GAD7 TOTAL SCORE: 5
7. FEELING AFRAID AS IF SOMETHING AWFUL MIGHT HAPPEN: NOT AT ALL
3. WORRYING TOO MUCH ABOUT DIFFERENT THINGS: SEVERAL DAYS
IF YOU CHECKED OFF ANY PROBLEMS ON THIS QUESTIONNAIRE, HOW DIFFICULT HAVE THESE PROBLEMS MADE IT FOR YOU TO DO YOUR WORK, TAKE CARE OF THINGS AT HOME, OR GET ALONG WITH OTHER PEOPLE: NOT DIFFICULT AT ALL

## 2018-08-13 ASSESSMENT — PATIENT HEALTH QUESTIONNAIRE - PHQ9: 5. POOR APPETITE OR OVEREATING: SEVERAL DAYS

## 2018-08-13 NOTE — MR AVS SNAPSHOT
"              After Visit Summary   2018    Yeni Gaston    MRN: [id number]           Patient Information     Date Of Birth          1985        Visit Information        Provider Department      2018 11:40 AM Annie Guerrier MD [facility]        Today's Diagnoses     Anxiety        Moderate episode of recurrent major depressive disorder (H)        Mild intermittent asthma without complication           Follow-ups after your visit        Who to contact     If you have questions or need follow up information about today's clinic visit or your schedule please contact [facility] directly at [phone].  Normal or non-critical lab and imaging results will be communicated to you by MyChart, letter or phone within 4 business days after the clinic has received the results. If you do not hear from us within 7 days, please contact the clinic through [employer] or phone. If you have a critical or abnormal lab result, we will notify you by phone as soon as possible.  Submit refill requests through [facility] or call your pharmacy and they will forward the refill request to us. Please allow 3 business days for your refill to be completed.          Additional Information About Your Visit        MyChart Information     [facility] lets you send messages to your doctor, view your test results, renew your prescriptions, schedule appointments and more. To sign up, go to [email or web address] . Click on \"Log in\" on the left side of the screen, which will take you to the Welcome page. Then click on \"Sign up Now\" on the right side of the page.     You will be asked to enter the access code listed below, as well as some personal information. Please follow the directions to create your username and password.     Your access code is: [email or web address]  Expires: 11/15/2018 12:14 PM     Your access code will  in 90 days. If you need help or a new code, please call your [place] clinic or " "[phone].        Care EveryWhere ID     This is your Care EveryWhere ID. This could be used by other organizations to access your [place] medical records  [id number]        Your Vitals Were     Pulse Temperature Height Pulse Oximetry BMI (Body Mass Index)       65 98.5  F (36.9  C) (Oral) 5' 5\" (1.651 m) 98% 20.92 kg/m2        Blood Pressure from Last 3 Encounters:   08/13/18 (!) 86/52   04/18/18 99/72   10/12/17 96/68    Weight from Last 3 Encounters:   08/13/18 125 lb 11.2 oz (57 kg)   04/18/18 120 lb (54.4 kg)   10/12/17 124 lb 3.2 oz (56.3 kg)              Today, you had the following     No orders found for display         Today's Medication Changes          These changes are accurate as of 8/13/18 11:59 PM.  If you have any questions, ask your nurse or doctor.               These medicines have changed or have updated prescriptions.        Dose/Directions    venlafaxine 150 MG 24 hr capsule   Commonly known as:  EFFEXOR-XR   This may have changed:    - medication strength  - See the new instructions.   Used for:  Anxiety, Moderate episode of recurrent major depressive disorder (H)   Changed by:  Annie Guerrier MD        Dose:  150 mg   Take 1 capsule (150 mg) by mouth daily   Quantity:  60 capsule   Refills:  3            Where to get your medicines      These medications were sent to [facility] [address] [place] [facility] - [place], [place] - [address]  [address] [facility] [address]     Phone:  [phone]     albuterol 108 (90 Base) MCG/ACT inhaler    venlafaxine 150 MG 24 hr capsule                Primary Care Provider Office Phone # Fax #    Annie Guerrier -[phone] [address] [facility] [address]        Equal Access to Services     [facility] : Paulina Noonan, jesus jacobson, arianna bolanos. [facility] [phone].    ATENCIÓN: Si morenitala janna, tiene a berry disposición servicios " traci de asistencia lingüística. Jacque lance [phone].    We comply with applicable federal civil rights laws and [place] laws. We do not discriminate on the basis of race, color, national origin, age, disability, sex, sexual orientation, or gender identity.            Thank you!     Thank you for choosing [facility]  for your care. Our goal is always to provide you with excellent care. Hearing back from our patients is one way we can continue to improve our services. Please take a few minutes to complete the written survey that you may receive in the mail after your visit with us. Thank you!             Your Updated Medication List - Protect others around you: Learn how to safely use, store and throw away your medicines at www.disposemymeds.org.          This list is accurate as of 8/13/18 11:59 PM.  Always use your most recent med list.                   Brand Name Dispense Instructions for use Diagnosis    albuterol 108 (90 Base) MCG/ACT inhaler    PROAIR HFA/PROVENTIL HFA/VENTOLIN HFA    1 Inhaler    Inhale 2 puffs into the lungs every 6 hours as needed for shortness of breath / dyspnea or wheezing    Mild intermittent asthma without complication       ALPRAZolam 0.25 MG tablet    XANAX    10 tablet    Take 1 tablet (0.25 mg) by mouth daily as needed for anxiety    Anxiety attack       Fish Oil Oil      daily.        MIRENA (52 MG) 20 MCG/24HR IUD   Generic drug:  levonorgestrel      1 each (20 mcg) by Intrauterine route once        multivitamin per tablet      Take 1 tablet by mouth daily.        triamcinolone 55 MCG/ACT inhaler    NASACORT AQ    1 Bottle    Spray 2 sprays into both nostrils daily    Allergic state, initial encounter       venlafaxine 150 MG 24 hr capsule    EFFEXOR-XR    60 capsule    Take 1 capsule (150 mg) by mouth daily    Anxiety, Moderate episode of recurrent major depressive disorder (H)

## 2018-08-13 NOTE — NURSING NOTE
"Chief Complaint   Patient presents with     Depression     Anxiety     BP (!) 86/52  Pulse 65  Temp 98.5  F (36.9  C) (Oral)  Ht 5' 5\" (1.651 m)  Wt 125 lb 11.2 oz (57 kg)  SpO2 98%  BMI 20.92 kg/m2 Estimated body mass index is 20.92 kg/(m^2) as calculated from the following:    Height as of this encounter: 5' 5\" (1.651 m).    Weight as of this encounter: 125 lb 11.2 oz (57 kg).  Medication Reconciliation: complete      Health Maintenance that is potentially due pending provider review:  NONE    n/a    GAVIN Quiñonez  "

## 2018-08-13 NOTE — PROGRESS NOTES
SUBJECTIVE:   Yeni Gaston is a 32 year old female who presents to clinic today for the following health issues:      Depression and Anxiety Follow-Up    Status since last visit: Improved with medication  Effexor 75 mg twice daily    occasional doses of xanax helped some    Now stressed as closing house  soon    Other associated symptoms:None    Complicating factors:     Significant life event: Yes-  Buying a house      Current substance abuse: None    PHQ-9 4/18/2018 8/13/2018   Total Score 9 5   Q9: Suicide Ideation Several days Not at all     WILBER-7 SCORE 7/16/2014 4/18/2018 8/13/2018   Total Score 2 - -   Total Score -  (minimal anxiety) -   Total Score - 21 5       PHQ-9  English  PHQ-9   Any Language  WILBER-7  Suicide Assessment Five-step Evaluation and Treatment (SAFE-T)    Amount of exercise or physical activity: 1 day/week for an average of 30-45 minutes    Problems taking medications regularly: No    Medication side effects: dizziness - has subsided     Diet: lactose free        PROBLEMS TO ADD ON...  -------------------------------------    Problem list and histories reviewed & adjusted, as indicated.  Additional history: as documented    Patient Active Problem List   Diagnosis     Other acne     Other symptoms referable to back     CARDIOVASCULAR SCREENING; LDL GOAL LESS THAN 160     IUD (intrauterine device) in place     Anxiety     Moderate episode of recurrent major depressive disorder (H)     Past Surgical History:   Procedure Laterality Date     HC CLOSED TX RAD/ULNAR SHAFT FX W/ MANIP  3/30/90    (R) closed reduction & long arm cast application       Social History   Substance Use Topics     Smoking status: Former Smoker     Quit date: 4/18/2011     Smokeless tobacco: Never Used     Alcohol use Yes      Comment: rarely     Family History   Problem Relation Age of Onset     Depression Mother      Allergies Mother      Circulatory Mother      Mental Illness Mother      Respiratory Father      Asthma  "    Depression Father      Allergies Father      Pacemaker Father      Cerebrovascular Disease Father      Mental Illness Father      Mental Illness Sister      HEART DISEASE Maternal Grandmother      Alcohol/Drug Maternal Grandmother      Mental Illness Maternal Grandmother      Genitourinary Problems Maternal Grandfather      Kidney stones     Arthritis Maternal Grandfather      Cerebrovascular Disease Maternal Grandfather      Mental Illness Maternal Grandfather      Alcohol/Drug Paternal Grandmother      Aneurysm Paternal Grandmother      Mental Illness Paternal Grandmother      Mental Illness Paternal Grandfather      Allergies Sister      Mental Illness Sister      Aneurysm Paternal Uncle            Reviewed and updated as needed this visit by clinical staff  Tobacco  Allergies  Meds       Reviewed and updated as needed this visit by Provider  Meds         ROS:  Constitutional, HEENT, cardiovascular, pulmonary, gi and gu systems are negative, except as otherwise noted.    OBJECTIVE:     BP (!) 86/52  Pulse 65  Temp 98.5  F (36.9  C) (Oral)  Ht 5' 5\" (1.651 m)  Wt 125 lb 11.2 oz (57 kg)  SpO2 98%  BMI 20.92 kg/m2  Body mass index is 20.92 kg/(m^2).  GENERAL: healthy, alert and no distress  NECK: no adenopathy, no asymmetry, masses, or scars and thyroid normal to palpation  RESP: lungs clear to auscultation - no rales, rhonchi or wheezes  CV: regular rate and rhythm, normal S1 S2, no S3 or S4, no murmur, click or rub, no peripheral edema and peripheral pulses strong  ABDOMEN: soft, nontender, no hepatosplenomegaly, no masses and bowel sounds normal  PSYCH: mentation appears normal, affect normal/bright  PHQ-9 SCORE 4/18/2018 8/13/2018   Total Score MyChart 9 (Mild depression) -   Total Score 9 5     WILBER-7 SCORE 7/16/2014 4/18/2018 8/13/2018   Total Score 2 - -   Total Score -  (minimal anxiety) -   Total Score - 21 5       ASSESSMENT/PLAN:   1. Anxiety  Improved   Increased the dose to 150 mg once " daily  Follow up in 4 weeks on phone & then 6 months return office visit   - venlafaxine (EFFEXOR-XR) 150 MG 24 hr capsule; Take 1 capsule (150 mg) by mouth daily  Dispense: 60 capsule; Refill: 3    2. Moderate episode of recurrent major depressive disorder (H)  Improved.  Follow up as above   - venlafaxine (EFFEXOR-XR) 150 MG 24 hr capsule; Take 1 capsule (150 mg) by mouth daily  Dispense: 60 capsule; Refill: 3    3. Mild intermittent asthma without complication  Excercise induced. No new concerns   - albuterol (PROAIR HFA/PROVENTIL HFA/VENTOLIN HFA) 108 (90 Base) MCG/ACT inhaler; Inhale 2 puffs into the lungs every 6 hours as needed for shortness of breath / dyspnea or wheezing  Dispense: 1 Inhaler; Refill: 11  ACT Total Scores 8/13/2018   ACT TOTAL SCORE (Goal Greater than or Equal to 20) 25   In the past 12 months, how many times did you visit the emergency room for your asthma without being admitted to the hospital? 0   In the past 12 months, how many times were you hospitalized overnight because of your asthma? 0       Annie Guerrier MD  [facility]

## 2018-08-14 ASSESSMENT — PATIENT HEALTH QUESTIONNAIRE - PHQ9: SUM OF ALL RESPONSES TO PHQ QUESTIONS 1-9: 5

## 2018-08-14 ASSESSMENT — ANXIETY QUESTIONNAIRES: GAD7 TOTAL SCORE: 5

## 2018-08-18 ASSESSMENT — ASTHMA QUESTIONNAIRES: ACT_TOTALSCORE: 25

## 2018-09-19 ENCOUNTER — VIRTUAL VISIT (OUTPATIENT)
Dept: FAMILY MEDICINE | Facility: OTHER | Age: 33
End: 2018-09-19

## 2018-09-19 NOTE — PROGRESS NOTES
"Date:   Clinician: Radha Whipple  Clinician NPI: [id number]  Patient: Yeni Gaston  Patient : 1985  Patient Address: [address] [place] [address]  Patient Phone: [phone]  Visit Protocol: UTI  Patient Summary:  Yeni is a 32 year old ( : 1985 ) female who initiated a Visit for a presumed bladder infection. When asked the question \"Please sign me up to receive news, health information and promotions from [employer].\", Yeni responded \"No\".   Her symptoms started 1-3 days ago and consist of urgency, foul-smelling urine, feeling as if the bladder is never empty, dysuria, and urinary frequency.   Symptom details   Urine color: The color of her urine is yellow.    Denied symptoms include flank pain, chills, vaginal itching, nausea, urinary incontinence, vaginal discharge, abdominal pain, and vomiting. She does not feel feverish.   Yeni has not used any over-the-counter medications or home remedies to relieve her current symptoms.  Precipitating events  Yeni denies having a sexually transmitted disease.  Pertinent medical history  Yeni has had a bladder infection before and has had 2 in the past 12 months. Her most recent bladder infection was not within the last 4 weeks. Her current symptoms are similar to her previous bladder infection symptoms.   Ciprofloxacin (Cipro) has been effective in treating her past bladder infections.   She has experienced problems or side effects with the following antibiotics in the past: sulfamethoxazole-trimethoprim (Bactrim DS).  Problems or side effects as reported by the patient (free text): I need to use the restroom more frequently and have a slight burning sensation.  My urine has the normal UTI smell, slightly off colored and cloudy.    Yeni typically gets a yeast infection when she takes antibiotics. She is not sure if she has used fluconazole (Diflucan) to treat previous yeast infections.   Yeni has not been prescribed antibiotics to " prevent frequent or repeated bladder infections in the past.   Yeni does not have a history of kidney stones. She has not used a catheter or been a patient in a hospital or nursing home in the past 2 weeks.   Yeni does not smoke or use smokeless tobacco.   She denies pregnancy and denies breastfeeding. She does not menstruate.   MEDICATIONS: venlafaxine oral, Nasacort nasal, ALLERGIES: Bactrim  Clinician Response:  Dear Yeni,  Based on the information you have provided, you likely have an acute urinary tract infection, also called a bladder infection. Bladder infections occur when bacteria from the outside of the body enters the urinary tract. Any part of the urinary system can be infected, but the bladder is the most common.  Medication information  I am prescribing:     Cephalexin (Keflex) 500 mg oral capsule. Take 1 capsule by mouth every 12 hours for 7 days. There are no refills with this prescription.   The medication I prescribed for your bladder infection is an antibiotic. Continue taking the medication until it is gone even if you feel better. If you get an upset stomach while taking antibiotics, taking the medication with food can help.   Yeast infections can be a common side effect of antibiotics. The most common symptom of a yeast infection is itchiness in and around the vagina. Other signs and symptoms include burning, redness, or a thick, white vaginal discharge that looks like cottage cheese and does not have a bad smell.  Self care  Urination helps to flush bacteria from the urinary tract. For this reason, drinking water and urinating often helps relieve some symptoms of a bladder infection and can decrease your risk of getting bladder infections in the future.  Other steps you can take to prevent future bladder infections include:     Wipe front to back after using the bathroom    Urinate after sexual intercourse    Avoid using deodorant sprays, douches, or powders in the vaginal area     When to  seek care  Please make an appointment to be seen in a clinic or urgent care if any of the following occur:     You develop new symptoms or your symptoms become worse    You have medication side effects that make it difficult to take them as prescribed    Your symptoms do not improve within 1-2 days of starting treatment    You have symptoms of a bladder infection that return shortly after completing treatment     It is possible to have an allergic reaction to an antibiotic even if you have not had one in the past. If you notice a new rash, significant swelling, or difficulty breathing, stop taking this medication immediately and go to a clinic or urgent care.   Diagnosis: Acute uncomplicated bladder infection  Diagnosis ICD: N39.0  Prescription: cephalexin (Keflex) 500 mg oral capsule 14 capsule, 7 days supply. Take 1 capsule by mouth every 12 hours for 7 days. Refills: 0, Refill as needed: no, Allow substitutions: yes  Pharmacy: [facility] [id number] [facility] - [phone] - [address], [place] [address]

## 2018-10-09 ENCOUNTER — VIRTUAL VISIT (OUTPATIENT)
Dept: FAMILY MEDICINE | Facility: OTHER | Age: 33
End: 2018-10-09

## 2018-10-09 NOTE — PROGRESS NOTES
"Date:   Clinician: Reji Eisenberg  Clinician NPI: [id number]  Patient: Yeni Gaston  Patient : 1985  Patient Address: [address] [place] [address]  Patient Phone: [phone]  Visit Protocol: URI  Patient Summary:  Yeni is a 32 year old ( : 1985 ) female who initiated a Visit for cold, sinus infection, or influenza. When asked the question \"Please sign me up to receive news, health information and promotions from [employer].\", Yeni responded \"No\".    Yeni states her symptoms started gradually 3-6 days ago.   Her symptoms consist of a headache, a sore throat, ear pain, nasal congestion, malaise, facial pain or pressure, myalgia, rhinitis, and a cough.   Symptom details     Nasal secretions: The color of her mucus is yellow.    Cough: Yeni coughs every 5-10 minutes and her cough is not more bothersome at night. Phlegm does not come into her throat when she coughs.     Sore throat: Yeni reports having moderate throat pain (4-6 on a 10 point pain scale), does not have exudate on her tonsils, and can swallow liquids. The lymph nodes in her neck are not enlarged. A rash has not appeared on the skin since the sore throat started.     Facial pain or pressure: The facial pain or pressure feels worse when bending over or leaning forward.     Headache: She states the headache is moderate (4-6 on a 10 point pain scale).      Yeni denies having enlarged lymph nodes, dyspnea, fever, wheezing, teeth pain, and chills. She also denies double sickening (worsening symptoms after initial improvement) and having recent facial or sinus surgery in the past 60 days.   Within the past week, Yeni has not been exposed to someone with strep throat. She has not recently been exposed to someone with influenza. Yeni has not been in close contact with any high risk individuals.   Yeni had 1 sinus infection within the past year.   Yeni has taken an antibiotic medication in the past month. Antibiotic details " as reported by the patient (free text): cephalexin: Treated a UTI on 9/19   Weight: 128 lbs   Yeni does not smoke or use smokeless tobacco.   She denies pregnancy and denies breastfeeding. She has menstruated in the past month.   MEDICATIONS: venlafaxine oral, Nasacort nasal, ALLERGIES: Bactrim  Clinician Response:  Dear Yeni,  Based on the information provided, you have a viral upper respiratory infection, otherwise known as a cold. Symptoms vary from person to person, but can include sneezing, coughing, a runny nose, sore throat, and headache and range from mild to severe.  Unfortunately, there are no medications that can cure a cold, so treatment is focused on controlling symptoms as much as possible. Most people gradually feel better until symptoms are gone in 1-2 weeks.  Medication information  Because you have a viral infection, antibiotics will not help you get better. Treating a viral infection with antibiotics could actually make you feel worse.  I am prescribing:       Fluticasone 50 mcg/actuation nasal spray. Inhale 2 sprays in each nostril 1 time per day; after 1 week, may adjust to 1 - 2 sprays in each nostril 1 time per day. This medication takes several days to start working, so keep taking it even if it doesn't help right away. There are no refills with this prescription.      Benzonatate (Tessalon Perles) 100 mg oral capsule. Take 1-2 capsules by mouth 3 times per day as needed for your cough. There are no refills with this prescription.     Self care  The following tips will keep you as comfortable as possible while you recover:     Rest    Drink plenty of water and other liquids    Take a hot shower to loosen congestion    Use throat lozenges    Gargle with warm salt water (1/4 teaspoon of salt per 8 ounce glass of water)    Suck on frozen items such as popsicles or ice cubes    Drink hot tea with lemon and honey    Take a spoonful of honey to reduce your cough     When to seek care  Please be  seen in a clinic or urgent care if new symptoms develop, or symptoms become worse.  Call 911 or go to the emergency room if you feel that your throat is closing off, you suddenly develop a rash, you are unable to swallow fluids, you are drooling, or you are having difficulty breathing.   Diagnosis: Viral URI  Diagnosis ICD: J06.9  Prescription: fluticasone 50 mcg/actuation nasal spray,suspension 1 120 spray aerosol with adapter (grams), 30 days supply. Inhale 2 sprays in each nostril 1 time per day; after 1 week, may adjust to 1 - 2 sprays in each nostril 1 time per day.. Refills: 0, Refill as needed: no, Allow substitutions: yes  Prescription: benzonatate (Tessalon Perles) 100 mg oral capsule 30 capsule, 5 days supply. Take 1-2 capsules by mouth 3 times per day as needed. Refills: 0, Refill as needed: no, Allow substitutions: yes  Pharmacy: [facility] [id number] [facility] [employer] - [phone] - [address] [place] [address]

## 2019-06-15 DIAGNOSIS — F33.1 MODERATE EPISODE OF RECURRENT MAJOR DEPRESSIVE DISORDER (H): ICD-10-CM

## 2019-06-15 DIAGNOSIS — F41.9 ANXIETY: ICD-10-CM

## 2019-06-17 RX ORDER — VENLAFAXINE HYDROCHLORIDE 150 MG/1
CAPSULE, EXTENDED RELEASE ORAL
Qty: 30 CAPSULE | Refills: 0 | Status: SHIPPED | OUTPATIENT
Start: 2019-06-17 | End: 2019-06-27

## 2019-06-17 NOTE — TELEPHONE ENCOUNTER
"Last Written Prescription Date:  8/13/2018  Last Fill Quantity: 60,  # refills: 3   Last office visit: 8/13/2018 with prescribing provider:  Dr. Guerrier   Future Office Visit:      Requested Prescriptions   Pending Prescriptions Disp Refills     venlafaxine (EFFEXOR-XR) 150 MG 24 hr capsule [Pharmacy Med Name: VENLAFAXINE HCL  MG CAP] 60 capsule 3     Sig: TAKE 1 CAPSULE BY MOUTH EVERY DAY       Serotonin-Norepinephrine Reuptake Inhibitors  Failed - 6/15/2019 12:15 AM        Failed - PHQ-9 score of less than 5 in past 6 months     Please review last PHQ-9 score.           Failed - Normal serum creatinine on file in past 12 months     Recent Labs   Lab Test 02/17/17  1654   CR 0.69             Failed - Recent (6 mo) or future (30 days) visit within the authorizing provider's specialty     Patient had office visit in the last 6 months or has a visit in the next 30 days with authorizing provider or within the authorizing provider's specialty.  See \"Patient Info\" tab in inbasket, or \"Choose Columns\" in Meds & Orders section of the refill encounter.            Passed - Blood pressure under 140/90 in past 12 months     BP Readings from Last 3 Encounters:   08/13/18 (!) 86/52   04/18/18 99/72   10/12/17 96/68                 Passed - Medication is active on med list        Passed - Patient is age 18 or older        Passed - No active pregnancy on record        Passed - No positive pregnancy test in past 12 months          "

## 2019-06-17 NOTE — TELEPHONE ENCOUNTER
Medication is being filled for 1 time refill only due to:  Patient needs to be seen because overdue for follow up OV.   Saida Leal RN    Note from 8/13/18 OV:  . Anxiety  Improved   Increased the dose to 150 mg once daily  Follow up in 4 weeks on phone & then 6 months return office visit     4 week phone visit was not done.  Saida Leal RN

## 2019-06-25 ENCOUNTER — MYC REFILL (OUTPATIENT)
Dept: FAMILY MEDICINE | Facility: CLINIC | Age: 34
End: 2019-06-25

## 2019-06-25 DIAGNOSIS — F41.0 ANXIETY ATTACK: ICD-10-CM

## 2019-06-25 RX ORDER — ALPRAZOLAM 0.25 MG
0.25 TABLET ORAL DAILY PRN
Qty: 10 TABLET | Refills: 0 | Status: CANCELLED | OUTPATIENT
Start: 2019-06-25

## 2019-06-27 ENCOUNTER — OFFICE VISIT (OUTPATIENT)
Dept: FAMILY MEDICINE | Facility: CLINIC | Age: 34
End: 2019-06-27
Payer: COMMERCIAL

## 2019-06-27 VITALS
DIASTOLIC BLOOD PRESSURE: 62 MMHG | HEART RATE: 69 BPM | OXYGEN SATURATION: 98 % | BODY MASS INDEX: 22.34 KG/M2 | RESPIRATION RATE: 16 BRPM | TEMPERATURE: 98.3 F | HEIGHT: 66 IN | SYSTOLIC BLOOD PRESSURE: 97 MMHG | WEIGHT: 139 LBS

## 2019-06-27 DIAGNOSIS — Z30.09 FAMILY PLANNING: ICD-10-CM

## 2019-06-27 DIAGNOSIS — F41.9 ANXIETY: ICD-10-CM

## 2019-06-27 DIAGNOSIS — F33.1 MODERATE EPISODE OF RECURRENT MAJOR DEPRESSIVE DISORDER (H): ICD-10-CM

## 2019-06-27 DIAGNOSIS — Z30.432 ENCOUNTER FOR IUD REMOVAL: Primary | ICD-10-CM

## 2019-06-27 DIAGNOSIS — F41.0 ANXIETY ATTACK: ICD-10-CM

## 2019-06-27 PROCEDURE — 99214 OFFICE O/P EST MOD 30 MIN: CPT | Performed by: FAMILY MEDICINE

## 2019-06-27 RX ORDER — DESOGESTREL AND ETHINYL ESTRADIOL 0.15-0.03
1 KIT ORAL DAILY
Qty: 30 TABLET | Refills: 11 | Status: ON HOLD | OUTPATIENT
Start: 2019-06-27 | End: 2020-09-08

## 2019-06-27 RX ORDER — VENLAFAXINE HYDROCHLORIDE 150 MG/1
CAPSULE, EXTENDED RELEASE ORAL
Qty: 90 CAPSULE | Refills: 3 | Status: SHIPPED | OUTPATIENT
Start: 2019-06-27 | End: 2019-08-12

## 2019-06-27 RX ORDER — ALPRAZOLAM 0.25 MG
0.25 TABLET ORAL DAILY PRN
Qty: 10 TABLET | Refills: 0 | Status: ON HOLD | OUTPATIENT
Start: 2019-06-27 | End: 2020-09-08

## 2019-06-27 ASSESSMENT — PATIENT HEALTH QUESTIONNAIRE - PHQ9
SUM OF ALL RESPONSES TO PHQ QUESTIONS 1-9: 0
5. POOR APPETITE OR OVEREATING: NOT AT ALL

## 2019-06-27 ASSESSMENT — MIFFLIN-ST. JEOR: SCORE: 1352.25

## 2019-06-27 ASSESSMENT — ANXIETY QUESTIONNAIRES
GAD7 TOTAL SCORE: 0
5. BEING SO RESTLESS THAT IT IS HARD TO SIT STILL: NOT AT ALL
IF YOU CHECKED OFF ANY PROBLEMS ON THIS QUESTIONNAIRE, HOW DIFFICULT HAVE THESE PROBLEMS MADE IT FOR YOU TO DO YOUR WORK, TAKE CARE OF THINGS AT HOME, OR GET ALONG WITH OTHER PEOPLE: NOT DIFFICULT AT ALL
1. FEELING NERVOUS, ANXIOUS, OR ON EDGE: NOT AT ALL
3. WORRYING TOO MUCH ABOUT DIFFERENT THINGS: NOT AT ALL
7. FEELING AFRAID AS IF SOMETHING AWFUL MIGHT HAPPEN: NOT AT ALL
6. BECOMING EASILY ANNOYED OR IRRITABLE: NOT AT ALL
2. NOT BEING ABLE TO STOP OR CONTROL WORRYING: NOT AT ALL

## 2019-06-27 NOTE — PATIENT INSTRUCTIONS
The following counseling on birth control pills was done:  Birth control pills are a medication you take every day to prevent pregnancy. If birth control pills are always used correctly, less than 1 out of 100 women using them will get pregnant each year. When you first start the pill, it takes several days to begin working. Be sure to use backup birth control (like a condom) for the first 7 days preferably till the first packet is completed.  The hormones in the pill keep your ovaries from releasing eggs and thicken your cervical mucus to block sperm from getting into the uterus.  Most women can get pregnant quickly when they stop using the pill.  Your periods may become lighter and less painful if you take the pill.  The hormones in pills offer health benefits. The pill can offer some protection against acne, non-cancerous breast growths, ectopic pregnancy, endometrial and ovarian cancers, iron deficiency anemia, and ovarian cysts.  They include bleeding between periods, breast tenderness, and nausea. Some of the most common side effects only last for the first few months.   Risks discussed including risk for heart attack, stroke and blood clots, worsening of headache or migraine headache .  Patient is not a smoker and has no personal or family history of blood clots/bleeding disorders.

## 2019-06-27 NOTE — NURSING NOTE
"Chief Complaint   Patient presents with     IUD     removal      Contraception     BP 97/62 (BP Location: Left arm, Patient Position: Sitting, Cuff Size: Adult Regular)   Pulse 69   Temp 98.3  F (36.8  C) (Oral)   Resp 16   Ht 1.676 m (5' 6\")   Wt 63 kg (139 lb)   SpO2 98%   Breastfeeding? No   BMI 22.44 kg/m   Estimated body mass index is 22.44 kg/m  as calculated from the following:    Height as of this encounter: 1.676 m (5' 6\").    Weight as of this encounter: 63 kg (139 lb).  bp completed using cuff size: regular       Health Maintenance addressed:  ACT and Tdap    Possibly completing today per provider review.    Troy Perez MA     " no

## 2019-06-27 NOTE — PROGRESS NOTES
"Subjective     Yeni Gaston is a 33 year old female who presents to clinic today for the following health issues:    HPI   Patient is here for an IUD removal , placed in 2014  and discuss other options for contraception.   She is interested in oral contraceptive pills   Has plans to maybe consider starting family in next 1-2 yrs but not just quite yet.   Has no problems taking a pill daily.    History of anxiety & depression  Need refill on Effexor , has been doing well. No side effects  Has used xanax for anxiety attack in past >14 month about 8 tabs, would like to keep some handy. No concerns with drugs or alcohol abuse .  PROBLEMS TO ADD ON...    BP Readings from Last 3 Encounters:   06/27/19 97/62   08/13/18 (!) 86/52   04/18/18 99/72    Wt Readings from Last 3 Encounters:   06/27/19 63 kg (139 lb)   08/13/18 57 kg (125 lb 11.2 oz)   04/18/18 54.4 kg (120 lb)                    PROBLEMS TO ADD ON...  Reviewed and updated as needed this visit by Provider  Problems         Review of Systems   ROS COMP: Constitutional, HEENT, cardiovascular, pulmonary, GI, , musculoskeletal, neuro, skin, endocrine and psych systems are negative, except as otherwise noted.      Objective    BP 97/62 (BP Location: Left arm, Patient Position: Sitting, Cuff Size: Adult Regular)   Pulse 69   Temp 98.3  F (36.8  C) (Oral)   Resp 16   Ht 1.676 m (5' 6\")   Wt 63 kg (139 lb)   SpO2 98%   Breastfeeding? No   BMI 22.44 kg/m    Body mass index is 22.44 kg/m .  Physical Exam   GENERAL: healthy, alert and no distress  NECK: no adenopathy, no asymmetry, masses, or scars and thyroid normal to palpation  RESP: lungs clear to auscultation - no rales, rhonchi or wheezes  CV: regular rate and rhythm, normal S1 S2, no S3 or S4, no murmur, click or rub, no peripheral edema and peripheral pulses strong  ABDOMEN: soft, nontender, no hepatosplenomegaly, no masses and bowel sounds normal   (female): IUD string identified and removed without " difficulty.normal female external genitalia, normal urethral meatus, vaginal mucosa pink, moist, well rugated, and normal cervix/adnexa/uterus without masses or discharge  NEURO: Normal strength and tone, mentation intact and speech normal  PSYCH: mentation appears normal, affect normal/bright    Diagnostic Test Results:  Labs reviewed in Epic        Assessment & Plan     1. Encounter for IUD removal  Assessment: 32 yo female   Tolerated Merina well- date of placement 2014   Removal 19, without any difficulty- patient tolerated the procedure well  - starting ocp- with plans for starting family next year    2. Family planning    - desogestrel-ethinyl estradiol (APRI) 0.15-30 MG-MCG tablet; Take 1 tablet by mouth daily  Dispense: 30 tablet; Refill: 11  Please see patient instructions   Recheck Blood pressure in 1 month- nurse only appointment ok   3. Anxiety  WILBER-7 SCORE 2018   Total Score - - -   Total Score  (minimal anxiety) - -   Total Score 21 5 0       Refill given for 1 yr  - venlafaxine (EFFEXOR-XR) 150 MG 24 hr capsule; TAKE 1 CAPSULE BY MOUTH EVERY DAY  Dispense: 90 capsule; Refill: 3    4. Moderate episode of recurrent major depressive disorder (H)  PHQ-9 SCORE 2018   PHQ-9 Total Score MyChart 9 (Mild depression) - -   PHQ-9 Total Score 9 5 0     - venlafaxine (EFFEXOR-XR) 150 MG 24 hr capsule; TAKE 1 CAPSULE BY MOUTH EVERY DAY  Dispense: 90 capsule; Refill: 3  Previously tried lexapro-it helped butt caused low libido.    5. Anxiety attack    - ALPRAZolam (XANAX) 0.25 MG tablet; Take 1 tablet (0.25 mg) by mouth daily as needed for anxiety  Dispense: 10 tablet; Refill: 0   Patient understands short term treatment , as needed  Use of benzo.  Refill needed only 1 a yr  If more, will need return office visit       Potential medication side effects were discussed with the patient; let me know if any occur.        Return in about 4 weeks (around  7/25/2019) for medications recheck/review/refill, BP Recheck/ HTN.    Annie Guerrier MD  [facility]

## 2019-06-28 ASSESSMENT — ANXIETY QUESTIONNAIRES: GAD7 TOTAL SCORE: 0

## 2019-06-28 ASSESSMENT — ASTHMA QUESTIONNAIRES: ACT_TOTALSCORE: 24

## 2019-08-12 ENCOUNTER — OFFICE VISIT (OUTPATIENT)
Dept: FAMILY MEDICINE | Facility: CLINIC | Age: 34
End: 2019-08-12
Payer: COMMERCIAL

## 2019-08-12 VITALS
HEART RATE: 52 BPM | TEMPERATURE: 98.8 F | BODY MASS INDEX: 22.13 KG/M2 | HEIGHT: 66 IN | OXYGEN SATURATION: 99 % | DIASTOLIC BLOOD PRESSURE: 68 MMHG | RESPIRATION RATE: 14 BRPM | WEIGHT: 137.7 LBS | SYSTOLIC BLOOD PRESSURE: 108 MMHG

## 2019-08-12 DIAGNOSIS — Z00.00 ROUTINE GENERAL MEDICAL EXAMINATION AT A HEALTH CARE FACILITY: Primary | ICD-10-CM

## 2019-08-12 DIAGNOSIS — J45.20 MILD INTERMITTENT ASTHMA WITHOUT COMPLICATION: ICD-10-CM

## 2019-08-12 DIAGNOSIS — Z12.4 SCREENING FOR CERVICAL CANCER: ICD-10-CM

## 2019-08-12 DIAGNOSIS — F33.1 MODERATE EPISODE OF RECURRENT MAJOR DEPRESSIVE DISORDER (H): ICD-10-CM

## 2019-08-12 DIAGNOSIS — Z11.3 SCREEN FOR STD (SEXUALLY TRANSMITTED DISEASE): ICD-10-CM

## 2019-08-12 DIAGNOSIS — F41.9 ANXIETY: ICD-10-CM

## 2019-08-12 LAB
DEPRECATED CALCIDIOL+CALCIFEROL SERPL-MC: 42 UG/L (ref 20–75)
HIV 1+2 AB+HIV1 P24 AG SERPL QL IA: NONREACTIVE
T PALLIDUM AB SER QL: NONREACTIVE
TSH SERPL DL<=0.005 MIU/L-ACNC: 1.6 MU/L (ref 0.4–4)

## 2019-08-12 PROCEDURE — 87624 HPV HI-RISK TYP POOLED RSLT: CPT | Performed by: FAMILY MEDICINE

## 2019-08-12 PROCEDURE — 87389 HIV-1 AG W/HIV-1&-2 AB AG IA: CPT | Performed by: FAMILY MEDICINE

## 2019-08-12 PROCEDURE — 36415 COLL VENOUS BLD VENIPUNCTURE: CPT | Performed by: FAMILY MEDICINE

## 2019-08-12 PROCEDURE — G0145 SCR C/V CYTO,THINLAYER,RESCR: HCPCS | Performed by: FAMILY MEDICINE

## 2019-08-12 PROCEDURE — 82306 VITAMIN D 25 HYDROXY: CPT | Performed by: FAMILY MEDICINE

## 2019-08-12 PROCEDURE — 86780 TREPONEMA PALLIDUM: CPT | Performed by: FAMILY MEDICINE

## 2019-08-12 PROCEDURE — 87491 CHLMYD TRACH DNA AMP PROBE: CPT | Performed by: FAMILY MEDICINE

## 2019-08-12 PROCEDURE — 84443 ASSAY THYROID STIM HORMONE: CPT | Performed by: FAMILY MEDICINE

## 2019-08-12 PROCEDURE — 99395 PREV VISIT EST AGE 18-39: CPT | Performed by: FAMILY MEDICINE

## 2019-08-12 PROCEDURE — 87591 N.GONORRHOEAE DNA AMP PROB: CPT | Performed by: FAMILY MEDICINE

## 2019-08-12 RX ORDER — ALBUTEROL SULFATE 90 UG/1
2 AEROSOL, METERED RESPIRATORY (INHALATION) EVERY 6 HOURS PRN
Qty: 1 INHALER | Refills: 11 | Status: SHIPPED | OUTPATIENT
Start: 2019-08-12

## 2019-08-12 RX ORDER — VENLAFAXINE HYDROCHLORIDE 150 MG/1
CAPSULE, EXTENDED RELEASE ORAL
Qty: 90 CAPSULE | Refills: 3 | Status: ON HOLD | OUTPATIENT
Start: 2019-08-12 | End: 2020-09-08

## 2019-08-12 ASSESSMENT — ANXIETY QUESTIONNAIRES
1. FEELING NERVOUS, ANXIOUS, OR ON EDGE: SEVERAL DAYS
IF YOU CHECKED OFF ANY PROBLEMS ON THIS QUESTIONNAIRE, HOW DIFFICULT HAVE THESE PROBLEMS MADE IT FOR YOU TO DO YOUR WORK, TAKE CARE OF THINGS AT HOME, OR GET ALONG WITH OTHER PEOPLE: VERY DIFFICULT
5. BEING SO RESTLESS THAT IT IS HARD TO SIT STILL: MORE THAN HALF THE DAYS
6. BECOMING EASILY ANNOYED OR IRRITABLE: MORE THAN HALF THE DAYS
GAD7 TOTAL SCORE: 10
3. WORRYING TOO MUCH ABOUT DIFFERENT THINGS: SEVERAL DAYS
2. NOT BEING ABLE TO STOP OR CONTROL WORRYING: SEVERAL DAYS
7. FEELING AFRAID AS IF SOMETHING AWFUL MIGHT HAPPEN: SEVERAL DAYS

## 2019-08-12 ASSESSMENT — PATIENT HEALTH QUESTIONNAIRE - PHQ9
5. POOR APPETITE OR OVEREATING: MORE THAN HALF THE DAYS
SUM OF ALL RESPONSES TO PHQ QUESTIONS 1-9: 9

## 2019-08-12 ASSESSMENT — MIFFLIN-ST. JEOR: SCORE: 1346.35

## 2019-08-12 NOTE — NURSING NOTE
"Chief Complaint   Patient presents with     Physical     /68   Pulse 52   Temp 98.8  F (37.1  C) (Oral)   Resp 14   Ht 1.676 m (5' 6\")   Wt 62.5 kg (137 lb 11.2 oz)   SpO2 99%   BMI 22.23 kg/m   Estimated body mass index is 22.23 kg/m  as calculated from the following:    Height as of this encounter: 1.676 m (5' 6\").    Weight as of this encounter: 62.5 kg (137 lb 11.2 oz).  bp completed using cuff size: regular      Health Maintenance addressed:  NONE    n/a    Latrice Araya MA    "

## 2019-08-12 NOTE — LETTER
My Asthma Action Plan    Name: Yeni Gaston   YOB: 1985  Date: 8/22/2019   My doctor: Annie Guerrier MD   My clinic: [facility]        My Rescue Medicine:   Albuterol inhaler (Proair/Ventolin/Proventil HFA)  2-4 puffs EVERY 4 HOURS as needed. Use a spacer if recommended by your provider.   My Asthma Severity:   Intermittent / Exercise Induced  Know your asthma triggers: exercise or sports             GREEN ZONE   Good Control    I feel good    No cough or wheeze    Can work, sleep and play without asthma symptoms       Take your asthma control medicine every day.     1. If exercise triggers your asthma, take your rescue medication    15 minutes before exercise or sports, and    During exercise if you have asthma symptoms  2. Spacer to use with inhaler: If you have a spacer, make sure to use it with your inhaler             YELLOW ZONE Getting Worse  I have ANY of these:    I do not feel good    Cough or wheeze    Chest feels tight    Wake up at night   1. Keep taking your Green Zone medications  2. Start taking your rescue medicine:    every 20 minutes for up to 1 hour. Then every 4 hours for 24-48 hours.  3. If you stay in the Yellow Zone for more than 12-24 hours, contact your doctor.  4. If you do not return to the Green Zone in 12-24 hours or you get worse, start taking your oral steroid medicine if prescribed by your provider.           RED ZONE Medical Alert - Get Help  I have ANY of these:    I feel awful    Medicine is not helping    Breathing getting harder    Trouble walking or talking    Nose opens wide to breathe       1. Take your rescue medicine NOW  2. If your provider has prescribed an oral steroid medicine, start taking it NOW  3. Call your doctor NOW  4. If you are still in the Red Zone after 20 minutes and you have not reached your doctor:    Take your rescue medicine again and    Call 911 or go to the emergency room right away    See your regular doctor within 2  weeks of an Emergency Room or Urgent Care visit for follow-up treatment.          Annual Reminders:  Meet with Asthma Educator,  Flu Shot in the Fall, consider Pneumonia Vaccination for patients with asthma (aged 19 and older).    Pharmacy:    [id number] [place], [place] - [address]  [id number] [place] - [address]  [id number] [place] [place] [place] [address]  [id number] [place], [place] - [address] [facility]                        Asthma Triggers  How To Control Things That Make Your Asthma Worse    Triggers are things that make your asthma worse.  Look at the list below to help you find your triggers and   what you can do about them. You can help prevent asthma flare-ups by staying away from your triggers.      Trigger                                                          What you can do   Cigarette Smoke  Tobacco smoke can make asthma worse. Do not allow smoking in your home, car or around you.  Be sure no one smokes at a child s day care or school.  If you smoke, ask your health care provider for ways to help you quit.  Ask family members to quit too.  Ask your health care provider for a referral to Quit Plan to help you quit smoking, or call 6-792-786-PLAN.     Colds, Flu, Bronchitis  These are common triggers of asthma. Wash your hands often.  Don t touch your eyes, nose or mouth.  Get a flu shot every year.     Dust Mites  These are tiny bugs that live in cloth or carpet. They are too small to see. Wash sheets and blankets in hot water every week.   Encase pillows and mattress in dust mite proof covers.  Avoid having carpet if you can. If you have carpet, vacuum weekly.   Use a dust mask and HEPA vacuum.   Pollen and Outdoor Mold  Some people are allergic to trees, grass, or weed pollen, or molds. Try to keep your windows closed.  Limit time out doors when pollen count is high.   Ask you health  care provider about taking medicine during allergy season.     Animal Dander  Some people are allergic to skin flakes, urine or saliva from pets with fur or feathers. Keep pets with fur or feathers out of your home.    If you can t keep the pet outdoors, then keep the pet out of your bedroom.  Keep the bedroom door closed.  Keep pets off cloth furniture and away from stuffed toys.     Mice, Rats, and Cockroaches  Some people are allergic to the waste from these pests.   Cover food and garbage.  Clean up spills and food crumbs.  Store grease in the refrigerator.   Keep food out of the bedroom.   Indoor Mold  This can be a trigger if your home has high moisture. Fix leaking faucets, pipes, or other sources of water.   Clean moldy surfaces.  Dehumidify basement if it is damp and smelly.   Smoke, Strong Odors, and Sprays  These can reduce air quality. Stay away from strong odors and sprays, such as perfume, powder, hair spray, paints, smoke incense, paint, cleaning products, candles and new carpet.   Exercise or Sports  Some people with asthma have this trigger. Be active!  Ask your doctor about taking medicine before sports or exercise to prevent symptoms.    Warm up for 5-10 minutes before and after sports or exercise.     Other Triggers of Asthma  Cold air:  Cover your nose and mouth with a scarf.  Sometimes laughing or crying can be a trigger.  Some medicines and food can trigger asthma.

## 2019-08-12 NOTE — PROGRESS NOTES
SUBJECTIVE:   CC: Yeni Gaston is an 33 year old woman who presents for preventive health visit.     Healthy Habits:     Getting at least 3 servings of Calcium per day:  Yes    Bi-annual eye exam:  Yes    Dental care twice a year:  Yes    Sleep apnea or symptoms of sleep apnea:  None    Diet:  Other    Frequency of exercise:  2-3 days/week    Duration of exercise:  15-30 minutes    Taking medications regularly:  Yes    Medication side effects:  Other    PHQ-2 Total Score: 2    Additional concerns today:  No      Would like refill on Effexor. It helps to be on it, than without  Does not want to increase the dose-  Higher doses gave night sweats.  Unpredictable stress at work.  Does get counseling /therapy  Does not want to increase the dose .        PHQ-9 SCORE 2018   PHQ-9 Total Score MyChart - - -   PHQ-9 Total Score 5 0 9     WILBER-7 SCORE 2018   Total Score - - -   Total Score - - -   Total Score 5 0 10             Today's PHQ-2 Score:   PHQ-2 (  [employer]) 2019   Q1: Little interest or pleasure in doing things 1   Q2: Feeling down, depressed or hopeless 1   PHQ-2 Score 2   Q1: Little interest or pleasure in doing things Several days   Q2: Feeling down, depressed or hopeless Several days   PHQ-2 Score 2       Abuse: Current or Past(Physical, Sexual or Emotional)- No  Do you feel safe in your environment? Yes    Social History     Tobacco Use     Smoking status: Former Smoker     Last attempt to quit: 2011     Years since quittin.3     Smokeless tobacco: Never Used   Substance Use Topics     Alcohol use: Yes     Comment: rarely     If you drink alcohol do you typically have >3 drinks per day or >7 drinks per week? No    Alcohol Use 2019   Prescreen: >3 drinks/day or >7 drinks/week? No   Prescreen: >3 drinks/day or >7 drinks/week? -   No flowsheet data found.    Reviewed orders with patient.  Reviewed health maintenance and updated orders  "accordingly - Yes  BP Readings from Last 3 Encounters:   08/12/19 108/68   06/27/19 97/62   08/13/18 (!) 86/52    Wt Readings from Last 3 Encounters:   08/12/19 62.5 kg (137 lb 11.2 oz)   06/27/19 63 kg (139 lb)   08/13/18 57 kg (125 lb 11.2 oz)                    Mammogram not appropriate for this patient based on age.    Pertinent mammograms are reviewed under the imaging tab.  History of abnormal Pap smear: NO - age 30- 65 PAP every 3 years recommended  PAP / HPV Latest Ref Rng & Units 8/12/2019 2/17/2017 6/17/2014   PAP - NIL NIL NIL   HPV 16 DNA NEG:Negative Negative Negative -   HPV 18 DNA NEG:Negative Negative Negative -   OTHER HR HPV NEG:Negative Negative Negative -     Reviewed and updated as needed this visit by clinical staff  Tobacco  Allergies  Meds         Reviewed and updated as needed this visit by Provider            Review of Systems  CONSTITUTIONAL: NEGATIVE for fever, chills, change in weight  INTEGUMENTARU/SKIN: NEGATIVE for worrisome rashes, moles or lesions  EYES: NEGATIVE for vision changes or irritation  ENT: NEGATIVE for ear, mouth and throat problems  RESP: NEGATIVE for significant cough or SOB  BREAST: NEGATIVE for masses, tenderness or discharge  CV: NEGATIVE for chest pain, palpitations or peripheral edema  GI: NEGATIVE for nausea, abdominal pain, heartburn, or change in bowel habits  : NEGATIVE for unusual urinary or vaginal symptoms. Periods are regular.  MUSCULOSKELETAL: NEGATIVE for significant arthralgias or myalgia  NEURO: NEGATIVE for weakness, dizziness or paresthesias  PSYCHIATRIC: NEGATIVE for changes in mood or affect     OBJECTIVE:   /68   Pulse 52   Temp 98.8  F (37.1  C) (Oral)   Resp 14   Ht 1.676 m (5' 6\")   Wt 62.5 kg (137 lb 11.2 oz)   SpO2 99%   BMI 22.23 kg/m    Physical Exam  GENERAL: healthy, alert and no distress  EYES: Eyes grossly normal to inspection, PERRL and conjunctivae and sclerae normal  HENT: ear canals and TM's normal, nose and mouth " without ulcers or lesions  NECK: no adenopathy, no asymmetry, masses, or scars and thyroid normal to palpation  RESP: lungs clear to auscultation - no rales, rhonchi or wheezes  BREAST: normal without masses, tenderness or nipple discharge and no palpable axillary masses or adenopathy  CV: regular rate and rhythm, normal S1 S2, no S3 or S4, no murmur, click or rub, no peripheral edema and peripheral pulses strong  ABDOMEN: soft, nontender, no hepatosplenomegaly, no masses and bowel sounds normal  MS: no gross musculoskeletal defects noted, no edema  SKIN: no suspicious lesions or rashes  NEURO: Normal strength and tone, mentation intact and speech normal  PSYCH: mentation appears normal, affect normal/bright  Vagina and vulva are normal;  no discharge is noted.  Cervix normal without lesions. Uterus anteverted and mobile, normal in size and shape without tenderness.  Adnexa normal in size without masses or tenderness. Pap Smear -sent.  Diagnostic Test Results:  Labs reviewed in Epic    ASSESSMENT/PLAN:   1. Routine general medical examination at a health care facility      34yo female with on going anxiety & depression.  2. Anxiety    - venlafaxine (EFFEXOR-XR) 150 MG 24 hr capsule; TAKE 1 CAPSULE BY MOUTH EVERY DAY  Dispense: 90 capsule; Refill: 3  - will continue with counseling.  We discussed adding Wellbutrin for both anxiety & depression and she is unwilling to change medications and will keep us posted if more concerns    3. Moderate episode of recurrent major depressive disorder (H)    - venlafaxine (EFFEXOR-XR) 150 MG 24 hr capsule; TAKE 1 CAPSULE BY MOUTH EVERY DAY  Dispense: 90 capsule; Refill: 3  - Vitamin D Deficiency  - TSH with free T4 reflex    4. Mild intermittent asthma without complication    - albuterol (PROAIR HFA/PROVENTIL HFA/VENTOLIN HFA) 108 (90 Base) MCG/ACT inhaler; Inhale 2 puffs into the lungs every 6 hours as needed for shortness of breath / dyspnea or wheezing  Dispense: 1 Inhaler;  "Refill: 11    5. Screening for cervical cancer    - Pap imaged thin layer screen with HPV - recommended age 30 - 65 years (select HPV order below)  - HPV High Risk Types DNA Cervical    6. Screen for STD (sexually transmitted disease)    - HIV Antigen Antibody Combo  - Chlamydia trachomatis PCR  - Neisseria gonorrhoeae PCR  - Treponema Abs w Reflex to RPR and Titer    COUNSELING:  Reviewed preventive health counseling, as reflected in patient instructions       Regular exercise       Healthy diet/nutrition    Estimated body mass index is 22.23 kg/m  as calculated from the following:    Height as of this encounter: 1.676 m (5' 6\").    Weight as of this encounter: 62.5 kg (137 lb 11.2 oz).         reports that she quit smoking about 8 years ago. She has never used smokeless tobacco.      Counseling Resources:  ATP IV Guidelines  Pooled Cohorts Equation Calculator  Breast Cancer Risk Calculator  FRAX Risk Assessment  ICSI Preventive Guidelines  Dietary Guidelines for Americans, 2010  USDA's MyPlate  ASA Prophylaxis  Lung CA Screening    Annie Guerrier MD  [facility]  "

## 2019-08-13 LAB
C TRACH DNA SPEC QL NAA+PROBE: NEGATIVE
N GONORRHOEA DNA SPEC QL NAA+PROBE: NEGATIVE
SPECIMEN SOURCE: NORMAL
SPECIMEN SOURCE: NORMAL

## 2019-08-13 ASSESSMENT — ASTHMA QUESTIONNAIRES: ACT_TOTALSCORE: 25

## 2019-08-13 ASSESSMENT — ANXIETY QUESTIONNAIRES: GAD7 TOTAL SCORE: 10

## 2019-08-14 LAB
COPATH REPORT: NORMAL
PAP: NORMAL

## 2019-08-14 NOTE — RESULT ENCOUNTER NOTE
All labs normal   Negative for chlamydia & gonorrhea & HIV, human immunodeficiency virus  and syphilis   -TSH (thyroid stimulating hormone) level is normal which indicates normal thyroid function.

## 2019-08-16 LAB
FINAL DIAGNOSIS: NORMAL
HPV HR 12 DNA CVX QL NAA+PROBE: NEGATIVE
HPV16 DNA SPEC QL NAA+PROBE: NEGATIVE
HPV18 DNA SPEC QL NAA+PROBE: NEGATIVE
SPECIMEN DESCRIPTION: NORMAL
SPECIMEN SOURCE CVX/VAG CYTO: NORMAL

## 2019-09-27 ENCOUNTER — E-VISIT (OUTPATIENT)
Dept: FAMILY MEDICINE | Facility: CLINIC | Age: 34
End: 2019-09-27
Payer: COMMERCIAL

## 2019-09-27 ENCOUNTER — TELEPHONE (OUTPATIENT)
Dept: FAMILY MEDICINE | Facility: CLINIC | Age: 34
End: 2019-09-27

## 2019-09-27 DIAGNOSIS — N30.00 ACUTE CYSTITIS WITHOUT HEMATURIA: Primary | ICD-10-CM

## 2019-09-27 PROCEDURE — 99444 ZZC PHYSICIAN ONLINE EVALUATION & MANAGEMENT SERVICE: CPT | Performed by: FAMILY MEDICINE

## 2019-09-27 RX ORDER — CIPROFLOXACIN 500 MG/1
500 TABLET, FILM COATED ORAL 2 TIMES DAILY
Qty: 6 TABLET | Refills: 0 | Status: ON HOLD | OUTPATIENT
Start: 2019-09-27 | End: 2020-09-08

## 2019-09-27 NOTE — TELEPHONE ENCOUNTER
Pt put in an e-visit this morning and is concerned that since Dr Guerrier is out of the office today that this won't be addressed. Is open to any provider that is available to get back to her on this

## 2019-11-03 ENCOUNTER — HEALTH MAINTENANCE LETTER (OUTPATIENT)
Age: 34
End: 2019-11-03

## 2019-11-11 ENCOUNTER — MYC MEDICAL ADVICE (OUTPATIENT)
Dept: FAMILY MEDICINE | Facility: CLINIC | Age: 34
End: 2019-11-11

## 2020-03-02 LAB
ABO + RH BLD: NORMAL
ABO + RH BLD: NORMAL
BLD GP AB SCN SERPL QL: NORMAL
C TRACH DNA SPEC QL PROBE+SIG AMP: NORMAL
HBV SURFACE AG SERPL QL IA: NORMAL
HIV 1+2 AB+HIV1 P24 AG SERPL QL IA: NORMAL
N GONORRHOEA DNA SPEC QL PROBE+SIG AMP: NORMAL
RUBELLA ABY IGG: NORMAL
TREPONEMA ANTIBODIES: NORMAL

## 2020-08-11 LAB — GROUP B STREP PCR: NORMAL

## 2020-09-08 ENCOUNTER — HOSPITAL ENCOUNTER (OUTPATIENT)
Facility: CLINIC | Age: 35
Discharge: HOME | End: 2020-09-08
Attending: OBSTETRICS & GYNECOLOGY | Admitting: OBSTETRICS & GYNECOLOGY
Payer: COMMERCIAL

## 2020-09-08 VITALS — SYSTOLIC BLOOD PRESSURE: 124 MMHG | TEMPERATURE: 97.7 F | DIASTOLIC BLOOD PRESSURE: 75 MMHG

## 2020-09-08 PROCEDURE — 59025 FETAL NON-STRESS TEST: CPT

## 2020-09-08 PROCEDURE — G0463 HOSPITAL OUTPT CLINIC VISIT: HCPCS | Mod: 25

## 2020-09-08 PROCEDURE — 25000132 ZZH RX MED GY IP 250 OP 250 PS 637

## 2020-09-08 RX ORDER — ONDANSETRON 2 MG/ML
4 INJECTION INTRAMUSCULAR; INTRAVENOUS EVERY 6 HOURS PRN
Status: DISCONTINUED | OUTPATIENT
Start: 2020-09-08 | End: 2020-09-08 | Stop reason: HOSPADM

## 2020-09-08 RX ORDER — HYDROXYZINE HYDROCHLORIDE 50 MG/1
100 TABLET, FILM COATED ORAL ONCE
Status: COMPLETED | OUTPATIENT
Start: 2020-09-08 | End: 2020-09-08

## 2020-09-08 RX ORDER — HYDROXYZINE HYDROCHLORIDE 50 MG/1
TABLET, FILM COATED ORAL
Status: COMPLETED
Start: 2020-09-08 | End: 2020-09-08

## 2020-09-08 RX ADMIN — HYDROXYZINE HYDROCHLORIDE 100 MG: 50 TABLET, FILM COATED ORAL at 21:12

## 2020-09-09 ENCOUNTER — ANESTHESIA EVENT (OUTPATIENT)
Dept: OBGYN | Facility: CLINIC | Age: 35
End: 2020-09-09
Payer: COMMERCIAL

## 2020-09-09 ENCOUNTER — ANESTHESIA (OUTPATIENT)
Dept: OBGYN | Facility: CLINIC | Age: 35
End: 2020-09-09
Payer: COMMERCIAL

## 2020-09-09 ENCOUNTER — HOSPITAL ENCOUNTER (INPATIENT)
Facility: CLINIC | Age: 35
LOS: 1 days | Discharge: HOME | End: 2020-09-10
Attending: OBSTETRICS & GYNECOLOGY | Admitting: OBSTETRICS & GYNECOLOGY
Payer: COMMERCIAL

## 2020-09-09 LAB
ABO + RH BLD: NORMAL
BLD GP AB SCN SERPL QL: NORMAL
BLOOD BANK CMNT PATIENT-IMP: NORMAL
LABORATORY COMMENT REPORT: NORMAL
SARS-COV-2 RNA SPEC QL NAA+PROBE: NEGATIVE
SARS-COV-2 RNA SPEC QL NAA+PROBE: NORMAL
SPECIMEN EXP DATE BLD: NORMAL
SPECIMEN EXP DATE BLD: NORMAL
SPECIMEN SOURCE: NORMAL
SPECIMEN SOURCE: NORMAL
T PALLIDUM AB SER QL: NONREACTIVE

## 2020-09-09 PROCEDURE — 12000035 ZZH R&B POSTPARTUM

## 2020-09-09 PROCEDURE — 25000128 H RX IP 250 OP 636: Performed by: ANESTHESIOLOGY

## 2020-09-09 PROCEDURE — 86901 BLOOD TYPING SEROLOGIC RH(D): CPT | Performed by: OBSTETRICS & GYNECOLOGY

## 2020-09-09 PROCEDURE — 86850 RBC ANTIBODY SCREEN: CPT | Performed by: OBSTETRICS & GYNECOLOGY

## 2020-09-09 PROCEDURE — 10907ZC DRAINAGE OF AMNIOTIC FLUID, THERAPEUTIC FROM PRODUCTS OF CONCEPTION, VIA NATURAL OR ARTIFICIAL OPENING: ICD-10-PCS | Performed by: OBSTETRICS & GYNECOLOGY

## 2020-09-09 PROCEDURE — 36415 COLL VENOUS BLD VENIPUNCTURE: CPT | Performed by: OBSTETRICS & GYNECOLOGY

## 2020-09-09 PROCEDURE — 59025 FETAL NON-STRESS TEST: CPT

## 2020-09-09 PROCEDURE — 25000125 ZZHC RX 250: Performed by: ANESTHESIOLOGY

## 2020-09-09 PROCEDURE — 86780 TREPONEMA PALLIDUM: CPT | Performed by: OBSTETRICS & GYNECOLOGY

## 2020-09-09 PROCEDURE — G0463 HOSPITAL OUTPT CLINIC VISIT: HCPCS | Mod: 25

## 2020-09-09 PROCEDURE — 37000011 ZZH ANESTHESIA WARD SERVICE

## 2020-09-09 PROCEDURE — 86900 BLOOD TYPING SEROLOGIC ABO: CPT | Performed by: OBSTETRICS & GYNECOLOGY

## 2020-09-09 PROCEDURE — 72200001 ZZH LABOR CARE VAGINAL DELIVERY SINGLE

## 2020-09-09 PROCEDURE — 0KQM0ZZ REPAIR PERINEUM MUSCLE, OPEN APPROACH: ICD-10-PCS | Performed by: OBSTETRICS & GYNECOLOGY

## 2020-09-09 PROCEDURE — 00HU33Z INSERTION OF INFUSION DEVICE INTO SPINAL CANAL, PERCUTANEOUS APPROACH: ICD-10-PCS | Performed by: ANESTHESIOLOGY

## 2020-09-09 PROCEDURE — 3E0R3BZ INTRODUCTION OF ANESTHETIC AGENT INTO SPINAL CANAL, PERCUTANEOUS APPROACH: ICD-10-PCS | Performed by: ANESTHESIOLOGY

## 2020-09-09 PROCEDURE — 25800030 ZZH RX IP 258 OP 636: Performed by: OBSTETRICS & GYNECOLOGY

## 2020-09-09 PROCEDURE — U0003 INFECTIOUS AGENT DETECTION BY NUCLEIC ACID (DNA OR RNA); SEVERE ACUTE RESPIRATORY SYNDROME CORONAVIRUS 2 (SARS-COV-2) (CORONAVIRUS DISEASE [COVID-19]), AMPLIFIED PROBE TECHNIQUE, MAKING USE OF HIGH THROUGHPUT TECHNOLOGIES AS DESCRIBED BY CMS-2020-01-R: HCPCS | Performed by: OBSTETRICS & GYNECOLOGY

## 2020-09-09 PROCEDURE — 25000125 ZZHC RX 250: Performed by: OBSTETRICS & GYNECOLOGY

## 2020-09-09 PROCEDURE — 25000132 ZZH RX MED GY IP 250 OP 250 PS 637: Performed by: OBSTETRICS & GYNECOLOGY

## 2020-09-09 RX ORDER — OXYTOCIN/0.9 % SODIUM CHLORIDE 30/500 ML
100-340 PLASTIC BAG, INJECTION (ML) INTRAVENOUS CONTINUOUS PRN
Status: DISCONTINUED | OUTPATIENT
Start: 2020-09-09 | End: 2020-09-09

## 2020-09-09 RX ORDER — IBUPROFEN 400 MG/1
800 TABLET, FILM COATED ORAL
Status: DISCONTINUED | OUTPATIENT
Start: 2020-09-09 | End: 2020-09-09

## 2020-09-09 RX ORDER — TRANEXAMIC ACID 10 MG/ML
1 INJECTION, SOLUTION INTRAVENOUS EVERY 30 MIN PRN
Status: DISCONTINUED | OUTPATIENT
Start: 2020-09-09 | End: 2020-09-10 | Stop reason: HOSPADM

## 2020-09-09 RX ORDER — IBUPROFEN 400 MG/1
800 TABLET, FILM COATED ORAL EVERY 6 HOURS PRN
Status: DISCONTINUED | OUTPATIENT
Start: 2020-09-09 | End: 2020-09-10 | Stop reason: HOSPADM

## 2020-09-09 RX ORDER — AMOXICILLIN 250 MG
1 CAPSULE ORAL 2 TIMES DAILY
Status: DISCONTINUED | OUTPATIENT
Start: 2020-09-09 | End: 2020-09-10 | Stop reason: HOSPADM

## 2020-09-09 RX ORDER — AMOXICILLIN 250 MG
2 CAPSULE ORAL 2 TIMES DAILY
Status: DISCONTINUED | OUTPATIENT
Start: 2020-09-09 | End: 2020-09-10 | Stop reason: HOSPADM

## 2020-09-09 RX ORDER — BISACODYL 10 MG
10 SUPPOSITORY, RECTAL RECTAL DAILY PRN
Status: DISCONTINUED | OUTPATIENT
Start: 2020-09-11 | End: 2020-09-10 | Stop reason: HOSPADM

## 2020-09-09 RX ORDER — LIDOCAINE HYDROCHLORIDE AND EPINEPHRINE 15; 5 MG/ML; UG/ML
INJECTION, SOLUTION EPIDURAL PRN
Status: DISCONTINUED | OUTPATIENT
Start: 2020-09-09 | End: 2020-09-10 | Stop reason: HOSPADM

## 2020-09-09 RX ORDER — TRANEXAMIC ACID 10 MG/ML
1 INJECTION, SOLUTION INTRAVENOUS EVERY 30 MIN PRN
Status: DISCONTINUED | OUTPATIENT
Start: 2020-09-09 | End: 2020-09-09

## 2020-09-09 RX ORDER — MODIFIED LANOLIN
OINTMENT (GRAM) TOPICAL
Status: DISCONTINUED | OUTPATIENT
Start: 2020-09-09 | End: 2020-09-10 | Stop reason: HOSPADM

## 2020-09-09 RX ORDER — EPHEDRINE SULFATE 50 MG/ML
5 INJECTION, SOLUTION INTRAMUSCULAR; INTRAVENOUS; SUBCUTANEOUS
Status: DISCONTINUED | OUTPATIENT
Start: 2020-09-09 | End: 2020-09-10 | Stop reason: HOSPADM

## 2020-09-09 RX ORDER — METHYLERGONOVINE MALEATE 0.2 MG/ML
200 INJECTION INTRAVENOUS
Status: DISCONTINUED | OUTPATIENT
Start: 2020-09-09 | End: 2020-09-10 | Stop reason: HOSPADM

## 2020-09-09 RX ORDER — OXYTOCIN 10 [USP'U]/ML
10 INJECTION, SOLUTION INTRAMUSCULAR; INTRAVENOUS
Status: DISCONTINUED | OUTPATIENT
Start: 2020-09-09 | End: 2020-09-10 | Stop reason: HOSPADM

## 2020-09-09 RX ORDER — OXYTOCIN/0.9 % SODIUM CHLORIDE 30/500 ML
100 PLASTIC BAG, INJECTION (ML) INTRAVENOUS CONTINUOUS
Status: DISCONTINUED | OUTPATIENT
Start: 2020-09-09 | End: 2020-09-10 | Stop reason: HOSPADM

## 2020-09-09 RX ORDER — LIDOCAINE 40 MG/G
CREAM TOPICAL
Status: DISCONTINUED | OUTPATIENT
Start: 2020-09-09 | End: 2020-09-10 | Stop reason: HOSPADM

## 2020-09-09 RX ORDER — ONDANSETRON 2 MG/ML
4 INJECTION INTRAMUSCULAR; INTRAVENOUS EVERY 6 HOURS PRN
Status: DISCONTINUED | OUTPATIENT
Start: 2020-09-09 | End: 2020-09-10 | Stop reason: HOSPADM

## 2020-09-09 RX ORDER — NALBUPHINE HYDROCHLORIDE 10 MG/ML
2.5-5 INJECTION, SOLUTION INTRAMUSCULAR; INTRAVENOUS; SUBCUTANEOUS EVERY 6 HOURS PRN
Status: DISCONTINUED | OUTPATIENT
Start: 2020-09-09 | End: 2020-09-10 | Stop reason: HOSPADM

## 2020-09-09 RX ORDER — NALOXONE HYDROCHLORIDE 0.4 MG/ML
.1-.4 INJECTION, SOLUTION INTRAMUSCULAR; INTRAVENOUS; SUBCUTANEOUS
Status: DISCONTINUED | OUTPATIENT
Start: 2020-09-09 | End: 2020-09-10 | Stop reason: HOSPADM

## 2020-09-09 RX ORDER — NALOXONE HYDROCHLORIDE 0.4 MG/ML
.1-.4 INJECTION, SOLUTION INTRAMUSCULAR; INTRAVENOUS; SUBCUTANEOUS
Status: DISCONTINUED | OUTPATIENT
Start: 2020-09-09 | End: 2020-09-09

## 2020-09-09 RX ORDER — OXYTOCIN/0.9 % SODIUM CHLORIDE 30/500 ML
340 PLASTIC BAG, INJECTION (ML) INTRAVENOUS CONTINUOUS PRN
Status: DISCONTINUED | OUTPATIENT
Start: 2020-09-09 | End: 2020-09-10 | Stop reason: HOSPADM

## 2020-09-09 RX ORDER — ONDANSETRON 4 MG/1
4 TABLET, ORALLY DISINTEGRATING ORAL EVERY 6 HOURS PRN
Status: DISCONTINUED | OUTPATIENT
Start: 2020-09-09 | End: 2020-09-10 | Stop reason: HOSPADM

## 2020-09-09 RX ORDER — HYDROCORTISONE 2.5 %
CREAM (GRAM) TOPICAL 3 TIMES DAILY PRN
Status: DISCONTINUED | OUTPATIENT
Start: 2020-09-09 | End: 2020-09-10 | Stop reason: HOSPADM

## 2020-09-09 RX ORDER — SODIUM CHLORIDE, SODIUM LACTATE, POTASSIUM CHLORIDE, CALCIUM CHLORIDE 600; 310; 30; 20 MG/100ML; MG/100ML; MG/100ML; MG/100ML
INJECTION, SOLUTION INTRAVENOUS CONTINUOUS
Status: DISCONTINUED | OUTPATIENT
Start: 2020-09-09 | End: 2020-09-10 | Stop reason: HOSPADM

## 2020-09-09 RX ORDER — ACETAMINOPHEN 325 MG/1
650 TABLET ORAL EVERY 4 HOURS PRN
Status: DISCONTINUED | OUTPATIENT
Start: 2020-09-09 | End: 2020-09-09

## 2020-09-09 RX ORDER — OXYTOCIN/0.9 % SODIUM CHLORIDE 30/500 ML
1-24 PLASTIC BAG, INJECTION (ML) INTRAVENOUS CONTINUOUS
Status: DISCONTINUED | OUTPATIENT
Start: 2020-09-09 | End: 2020-09-09

## 2020-09-09 RX ORDER — ACETAMINOPHEN 325 MG/1
650 TABLET ORAL EVERY 4 HOURS PRN
Status: DISCONTINUED | OUTPATIENT
Start: 2020-09-09 | End: 2020-09-10 | Stop reason: HOSPADM

## 2020-09-09 RX ORDER — OXYTOCIN 10 [USP'U]/ML
10 INJECTION, SOLUTION INTRAMUSCULAR; INTRAVENOUS
Status: DISCONTINUED | OUTPATIENT
Start: 2020-09-09 | End: 2020-09-09

## 2020-09-09 RX ORDER — CARBOPROST TROMETHAMINE 250 UG/ML
250 INJECTION, SOLUTION INTRAMUSCULAR
Status: DISCONTINUED | OUTPATIENT
Start: 2020-09-09 | End: 2020-09-10 | Stop reason: HOSPADM

## 2020-09-09 RX ORDER — OXYCODONE AND ACETAMINOPHEN 5; 325 MG/1; MG/1
1 TABLET ORAL
Status: DISCONTINUED | OUTPATIENT
Start: 2020-09-09 | End: 2020-09-10 | Stop reason: HOSPADM

## 2020-09-09 RX ADMIN — DOCUSATE SODIUM AND SENNOSIDES 1 TABLET: 8.6; 5 TABLET, FILM COATED ORAL at 20:03

## 2020-09-09 RX ADMIN — ACETAMINOPHEN 650 MG: 325 TABLET, FILM COATED ORAL at 20:07

## 2020-09-09 RX ADMIN — Medication 12 ML/HR: at 12:21

## 2020-09-09 RX ADMIN — ACETAMINOPHEN 650 MG: 325 TABLET, FILM COATED ORAL at 15:42

## 2020-09-09 RX ADMIN — SODIUM CHLORIDE, POTASSIUM CHLORIDE, SODIUM LACTATE AND CALCIUM CHLORIDE: 600; 310; 30; 20 INJECTION, SOLUTION INTRAVENOUS at 06:24

## 2020-09-09 RX ADMIN — IBUPROFEN 800 MG: 400 TABLET ORAL at 15:42

## 2020-09-09 RX ADMIN — Medication 5 MG: at 08:27

## 2020-09-09 RX ADMIN — Medication 2 MILLI-UNITS/MIN: at 08:08

## 2020-09-09 RX ADMIN — Medication 12 ML/HR: at 06:24

## 2020-09-09 RX ADMIN — LIDOCAINE HYDROCHLORIDE AND EPINEPHRINE 3 ML: 15; 5 INJECTION, SOLUTION EPIDURAL at 06:15

## 2020-09-09 RX ADMIN — SODIUM CHLORIDE, POTASSIUM CHLORIDE, SODIUM LACTATE AND CALCIUM CHLORIDE 1000 ML: 600; 310; 30; 20 INJECTION, SOLUTION INTRAVENOUS at 05:22

## 2020-09-09 RX ADMIN — SODIUM CHLORIDE, POTASSIUM CHLORIDE, SODIUM LACTATE AND CALCIUM CHLORIDE: 600; 310; 30; 20 INJECTION, SOLUTION INTRAVENOUS at 12:39

## 2020-09-09 NOTE — PLAN OF CARE
Data: [facility] at .   Reason for maternal/fetal assessment per patient is Contractions  . Patient is a . Prenatal record reviewed.   Gestational Age 39w3d. VSS. Cervix: dilated 1cm and effaced >70% (unchanged from clinic this AM.).  Fetal movement present. Patient denies backache, vaginal discharge, pelvic pressure, UTI symptoms, GI problems, bloody show, vaginal bleeding, edema, headache, visual disturbances, epigastric or URQ pain, abdominal pain, rupture of membranes. Pt states she has been melba since her appointment this AM. Says they have not gotten any stronger all day. Support persons present.  Action: Verbal consent for EFM. Triage assessment completed. EFM applied. Uterine assessment complete. Fetal assessment: Presumed adequate fetal oxygenation documented (see flow record).  Patient instructed to report change in fetal movement, vaginal leaking of fluid or bleeding, abdominal pain, or any concerns related to the pregnancy to her nurse/physician.   Response: Dr. Ram informed of status. Plan per provider is discharge. Pt given 100 mg vistaril. Instructed to come back if contractions get stronger. Patient verbalized understanding of education and verbalized agreement with plan. Discharged ambulatory at 2115.

## 2020-09-09 NOTE — PLAN OF CARE
Pt returned to [facility] stating contractions have continued and are now feeling stronger. TOCO and EFM applied. Cervix 3/90/-1. Contractions palpate moderate. Dr Ram updated on status. Admission orders received. Pt transferred to rm 218 via wheelchair. Report to RADHA Leos RN to assume care.

## 2020-09-09 NOTE — DISCHARGE INSTRUCTIONS
Discharge Instruction for Undelivered Patients      You were seen for: Labor Assessment  We Consulted: Dr Ram    Diet:   Drink 8 to 12 glasses of liquids (milk, juice, water) every day.  You may eat meals and snacks.     Activity:  Count fetal kicks everyday (see handout)  Call your doctor or nurse midwife if your baby is moving less than usual.     Call your provider if you notice:  Swelling in your face or increased swelling in your hands or legs.  Headaches that are not relieved by Tylenol (acetaminophen).  Changes in your vision (blurring: seeing spots or stars.)  Nausea (sick to your stomach) and vomiting (throwing up).   Weight gain of 5 pounds or more per week.  Heartburn that doesn't go away.  Signs of bladder infection: pain when you urinate (use the toilet), need to go more often and more urgently.  The bag of maurice (rupture of membranes) breaks, or you notice leaking in your underwear.  Bright red blood in your underwear.  Abdominal (lower belly) or stomach pain.  For first baby: Contractions (tightening) less than 5 minutes apart for one hour or more.  Increase or change in vaginal discharge (note the color and amount)    Follow-up:  As scheduled in the clinic

## 2020-09-09 NOTE — PLAN OF CARE
Pt comfortable with epidural. Ts category 1. Dr Ram updated. Orders received for pitocin augmentation.

## 2020-09-09 NOTE — PLAN OF CARE
Data: Yeni Gaston transferred to 423 via wheelchair at 1720. Baby transferred via parent's arms.  Action: Receiving unit notified of transfer: Yes. Patient and family notified of room change. Report given to Lorna MERA RN at 1720. Belongings sent to receiving unit. Accompanied by Registered Nurse. Oriented patient to surroundings. Call light within reach. ID bands double-checked with receiving RN.  Response: Patient tolerated transfer and is stable.

## 2020-09-09 NOTE — H&P
[facility] Labor and Delivery History and Physical    Yeni Loomis MRN# [id number]   Age: 34 year old YOB: 1985     Date of Admission:  2020    Primary care provider: Annie Guerrier           Chief Complaint:   Yeni Loomis is a 34 year old female who is 39w4d pregnant and being admitted for labor management.          Pregnancy history:     OBSTETRIC HISTORY:    OB History    Para Term  AB Living   2 1 1 0 1 1   SAB TAB Ectopic Multiple Live Births   0 0 0 0 1      # Outcome Date GA Lbr Adolfo/2nd Weight Sex Delivery Anes PTL Lv   2 Term 20 39w4d 10:00 / 00:43 3.71 kg (8 lb 2.9 oz) F Vag-Spont EPI  ANDREY      Name: VICTORIANO LOOMIS-YENI      Apgar1: 8  Apgar5: 9   1 AB                EDC: Estimated Date of Delivery: Data Unavailable    Prenatal Labs:   Lab Results   Component Value Date    ABO A 2020    ABO A 2020    RH Pos 2020    RH Pos 2020    AS Neg 2020    HEPBANG neg 2020    CHPCRT neg 2020    GCPCRT neg 2020    RUBELLAABIGG immune 2020    HGB 14.5 2014       GBS Status:   Lab Results   Component Value Date    GBS neg 2020       Active Problem List  Patient Active Problem List   Diagnosis     Other acne     Other symptoms referable to back     CARDIOVASCULAR SCREENING; LDL GOAL LESS THAN 160     Encounter for IUD removal     Anxiety     Moderate episode of recurrent major depressive disorder (H)     Indication for care in labor or delivery     Labor and delivery indication for care or intervention       Medication Prior to Admission  Medications Prior to Admission   Medication Sig Dispense Refill Last Dose     albuterol (PROAIR HFA/PROVENTIL HFA/VENTOLIN HFA) 108 (90 Base) MCG/ACT inhaler Inhale 2 puffs into the lungs every 6 hours as needed for shortness of breath / dyspnea or wheezing 1 Inhaler 11 More than a month at Unknown time     Multiple Vitamin (MULTIVITAMIN) per tablet Take 1 tablet  by mouth daily.      .        Maternal Past Medical History:     Past Medical History:   Diagnosis Date     Abnormal Pap smear and cervical HPV (human papillomavirus)     didn't do colp     Allergic rhinitis due to other allergen      Depressive disorder     not treated now, feels well     WILBER (generalised anxiety disorder) 2012     Other closed fractures of distal end of radius (alone)      Uncomplicated asthma     exercise induced     Varicella without mention of complication     Before the age of 1                               Social History:     Social History     Tobacco Use     Smoking status: Former Smoker     Last attempt to quit: 2011     Years since quittin.4     Smokeless tobacco: Never Used   Substance Use Topics     Alcohol use: Not Currently            Review of Systems:   C: NEGATIVE for fever, chills, change in weight  E/M: NEGATIVE for ear, mouth and throat problems  R: NEGATIVE for significant cough or SOB  CV: NEGATIVE for chest pain, palpitations or peripheral edema          Physical Exam:   Vitals were reviewed      Lungs: No increased work of breathing, good air exchange, clear to auscultation bilaterally, no crackles or wheezing.  Cardiovascular: Regular rate and rhythm, normal S1 and S2, no S3 or S4, and no murmur noted.  Abdomen: Gravid vertex  Cervix:   Membranes: AROM   Dilation: 10   Effacement: 100%   Station:+2    Presentation:Vertex  Fetal Heart Rate Tracing: reactive and reassuring  Tocometer: external monitor                       Assessment:   Yeni Gaston is a 39w4d pregnant female admitted with labor management.          Plan:   Anticipate     Anibal Ram MD

## 2020-09-09 NOTE — ANESTHESIA PROCEDURE NOTES
Procedure note : epidural catheter  Staff -   Anesthesiologist:  Byron Vieyra MD      Performed By: anesthesiologist        Pre-Procedure  Performed by Byron Vieyra MD  Location: OB      Pre-Anesthestic Checklist: patient identified, IV checked, risks and benefits discussed, informed consent, pre-op evaluation and at physician/surgeon's request    Timeout  Correct Patient: Yes   Correct Procedure: Yes       Correct Position: Yes     .   Procedure Documentation    .    Procedure: epidural catheter, .   Patient Position:sitting Insertion Site:L3-4  (midline approach) Injection technique: LORT air   Local skin infiltrated with 3 mL of 1% lidocaine.  MADALYN at 4 cm    Patient Prep/Sterile Barriers; mask, sterile gloves, povidone-iodine 7.5% surgical scrub, patient draped.  .  Needle: Touhy needle   Needle Gauge: 17.    Needle Length (Inches) 3.5   # of attempts: 1 and # of redirects:  .    Catheter: 19 G . .  Catheter threaded easily  4 cm epidural space.  .   .    Assessment/Narrative  Paresthesias: No.  .  .  Aspiration negative for heme or CSF  . Test dose of 3 mL lidocaine 1.5% w/ 1:200,000 epinephrine at. Test dose negative for signs of intravascular, subdural or intrathecal injection. Comments:  Test dose of 3cc negative   Adhesive spray, tegaderm, and tape to secure  Bolused with 10cc of the pump mix

## 2020-09-09 NOTE — ANESTHESIA PREPROCEDURE EVALUATION
"Anesthesia Pre-Procedure Evaluation    Patient: Yeni Gaston   MRN: [id number] : 1985          Preoperative Diagnosis: * No surgery found *        Past Medical History:   Diagnosis Date     Abnormal Pap smear and cervical HPV (human papillomavirus)     didn't do colp     Allergic rhinitis due to other allergen      Depressive disorder     not treated now, feels well     WILBER (generalised anxiety disorder) 2012     Other closed fractures of distal end of radius (alone)      Uncomplicated asthma     exercise induced     Varicella without mention of complication     Before the age of 1     Past Surgical History:   Procedure Laterality Date     HC CLOSED TX RAD/ULNAR SHAFT FX W/ MANIP  3/30/90    (R) closed reduction & long arm cast application     WISDOM ST GUIDEWIRE                          Lab Results   Component Value Date    WBC 11.7 (H) 2014    HGB 14.5 2014    HCT 42.0 2014     2014     2017    POTASSIUM 4.1 2017    CHLORIDE 105 2017    CO2 23 2017    BUN 10 2017    CR 0.69 2017    GLC 77 2017    JULIO 9.5 2017    TSH 1.60 2019    HCG Negative 2014       Preop Vitals  BP Readings from Last 3 Encounters:   20 111/68   20 124/75   19 108/68    Pulse Readings from Last 3 Encounters:   19 52   19 69   18 65      Resp Readings from Last 3 Encounters:   19 14   19 16   18 14    SpO2 Readings from Last 3 Encounters:   19 99%   19 98%   18 98%      Temp Readings from Last 1 Encounters:   20 36.6  C (97.8  F) (Axillary)    Ht Readings from Last 1 Encounters:   19 1.676 m (5' 6\")      Wt Readings from Last 1 Encounters:   19 62.5 kg (137 lb 11.2 oz)    Estimated body mass index is 22.23 kg/m  as calculated from the following:    Height as of 19: 1.676 m (5' 6\").    Weight as of 19: 62.5 kg (137 lb 11.2 oz). "       Anesthesia Plan      History & Physical Review      ASA Status:  2 .    NPO Status:  > 8 hours    Plan for Epidural     She does have an allergy to adhesive.  We discussed her options and that the less that is used the more likely the epidural could potentially come out.  She chose to use paper tape, tegaderm, and spray.          Postoperative Care      Consents                 Byron Vieyra MD

## 2020-09-09 NOTE — PLAN OF CARE
Patient arrived to the room at 0510 and requesting an epidural. Patient notified RN of her latex allergy and that she reacts to adhesive taps, getting a rash and skin breakdown pretty quickly. RN had patient look over all dressings and tapes before using them on the patient. MDA at the bedside for epidural placement and also went over dressings and tapes, patient consented to all tapes and adhesives before they were placed on her skin. This RN passed on this information  to the day nurse. Patient is now comfortable with an epidural and able to get rest. Natural latex free soto placed. Report given to day nurse.

## 2020-09-10 VITALS
RESPIRATION RATE: 16 BRPM | DIASTOLIC BLOOD PRESSURE: 73 MMHG | HEART RATE: 63 BPM | TEMPERATURE: 98.2 F | SYSTOLIC BLOOD PRESSURE: 114 MMHG | OXYGEN SATURATION: 97 %

## 2020-09-10 PROCEDURE — 25000132 ZZH RX MED GY IP 250 OP 250 PS 637: Performed by: OBSTETRICS & GYNECOLOGY

## 2020-09-10 RX ADMIN — IBUPROFEN 800 MG: 400 TABLET ORAL at 14:20

## 2020-09-10 RX ADMIN — IBUPROFEN 800 MG: 400 TABLET ORAL at 07:25

## 2020-09-10 RX ADMIN — ACETAMINOPHEN 650 MG: 325 TABLET, FILM COATED ORAL at 01:06

## 2020-09-10 RX ADMIN — DOCUSATE SODIUM AND SENNOSIDES 1 TABLET: 8.6; 5 TABLET, FILM COATED ORAL at 09:48

## 2020-09-10 RX ADMIN — ACETAMINOPHEN 650 MG: 325 TABLET, FILM COATED ORAL at 09:48

## 2020-09-10 RX ADMIN — IBUPROFEN 800 MG: 400 TABLET ORAL at 01:05

## 2020-09-10 RX ADMIN — ACETAMINOPHEN 650 MG: 325 TABLET, FILM COATED ORAL at 05:32

## 2020-09-10 NOTE — L&D DELIVERY NOTE
Delivery Date:  09/09/2020      Yeni is a 34-year-old, G1, P0 at 39-4/7 weeks who presented to Labor and Delivery originally on 09/08/2020, when she was having frequent contractions, but her cervix did not change over observation, and so she was sent home with some Vistaril.  She came back later in the morning at approximately 2:00-3:00 in the morning, and when she presented to Labor and Delivery, she was found to be 3 cm dilated.  She was admitted at that time.  She was very uncomfortable and received an epidural.  After the epidural was placed, she had good analgesic effect, but her labor stalled out at about 3-4 cm for several hours, so she was then started on IV Pitocin.  Contractions became more regular and intense.  She had artificial rupture of membranes performed.  Clear fluid was noted at approximately 9:05 a.m.  She continued to progress through labor and became complete at 1400.  She began pushing shortly afterwards and delivered at 1443 from an CHIDI presentation.  There was a nuchal cord that also was wrapped down around the leg.  This did not need to be reduced, but with delivery of the infant, the infant was placed on the maternal abdomen and was vigorous.  The cord was doubly clamped and cut after 1 minute delay.  Cord bloods were obtained, and then the placenta spontaneously delivered.  It was intact and had a 3-vessel cord.  The perineum was inspected, had a second-degree laceration which was repaired with 3-0 Vicryl in the usual manner.  There was an estimated blood loss of 350 mL.  The uterus was firm after delivery, and the IV Pitocin was continued.  The infant was a female infant, had Apgars of 8 and 9 at 1 and 5 minutes respectively and weighed 3710 grams, and again, sponge and needle counts were correct after the repair.      LABOR SUMMARY:  First stage of labor was 10 hours, second stage of labor was 43 minutes, third stage of labor was 5 minutes for a total labor of 10 hours and 48 minutes.   The prenatal course was a fairly unremarkable, but please see the notes regarding that.         BRITTANY MARSHALL MD             D: 2020   T: 2020   MT: CAMELIA      Name:     REYNALDO LOOMIS   MRN:      [id number]        Account:        [id number]   :      1985        Delivery Date:  2020               Document: [id number]

## 2020-09-10 NOTE — PLAN OF CARE
Patient arrived in room 423 around 1720.  Vital signs are stable.  Fundus is firm one finger below umbilicus.  Scant vaginal flow.  She has been oriented to room call light system, and infant safety procedures.  Educational folder has been introduced to patient.  Patient is encouraged to call whenever she has  questions and concerns.  Will continue to monitor.

## 2020-09-10 NOTE — PLAN OF CARE
Patient has stable vitals.  Fundus firm at U/1. Scant rubra flow.  Voiding without difficulty.  Breast feeding baby girl every 2 to 3 hours independently.  Denies need for tylenol when offered.  Will take ibuprofen later when available.  Encouraged to call with any questions/concerns.

## 2020-09-10 NOTE — PROGRESS NOTES
Postpartum Note    S: Pt doing well. Pain is well controlled.  No concerns.    O: /71   Pulse 70   Temp 97.6  F (36.4  C) (Oral)   Resp 16   SpO2 97%   Breastfeeding Unknown         ABD:  Uterine fundus is firm, non-tender and at the level of the umbilicus         Hemoglobin   Date Value Ref Range Status   07/16/2014 14.5 11.7 - 15.7 g/dL Final       A: Post-partum day #1 s/p Normal spontaneous vaginal delivery, lactating well    P: Continue PP Cares. Anticipate discharge today or tomorrow, rtc 6wks

## 2020-09-10 NOTE — PLAN OF CARE
Vitals stable. Pt managing pain with Tylenol and Ibuprofen. C/O tender bilateral breasts, baby appears to have good latch. PT using coconut oil on nipples after feedings. Independent with  cares. Will continue to monitor.

## 2020-09-10 NOTE — ANESTHESIA POSTPROCEDURE EVALUATION
Patient: Yeni Gaston    * No procedures listed *    Diagnosis:* No pre-op diagnosis entered *  Diagnosis Additional Information: No value filed.    Anesthesia Type:  Epidural    Note:  Anesthesia Post Evaluation    Patient location during evaluation: bedside  Patient participation: Able to fully participate in evaluation  Level of consciousness: awake  Pain management: adequate  Airway patency: patent  Cardiovascular status: acceptable  Respiratory status: acceptable  Hydration status: acceptable  PONV: none     Anesthetic complications: None    Comments: Patient denies any residual numbness or weakness in bilateral lower extremities, severe back pain, or positional headache. Patient was instructed to inform nurse of any of these symptoms and to contact anesthesia to reevaluate.        Last vitals:  Vitals:    09/10/20 0058 09/10/20 0948 09/10/20 1631   BP: 104/71 116/74 114/73   Pulse: 70 72 63   Resp: 16 16 16   Temp: 36.4  C (97.6  F) 36.7  C (98  F) 36.8  C (98.2  F)   SpO2:            Electronically Signed By: Jeffry Alvares MD  September 10, 2020  6:46 PM

## 2020-09-11 NOTE — PLAN OF CARE
D: VSS, assessments WDL.   I: Pt. received complete discharge paperwork.  Pt. was given times of last dose for all discharge medications in writing on discharge medication sheets.  Discharge teaching included home medication, pain management, activity restrictions, postpartum cares, and signs and symptoms of infection.    A: Discharge outcomes on care plan met.  Mother states understanding and comfort with self and baby cares.  P: Discharged to home.  Pt. was discharged with baby, and bands were checked at time of discharge.  Pt. was accompanied by , nurse and baby, and left with personal belongings.  Pt. to follow up with OB per MD order.  Pt. had no further questions at the time of discharge and no unmet needs were identified.

## 2020-09-11 NOTE — LACTATION NOTE
"Attempted Lactation visit near end of shift. Yeni in bathroom. Father of baby holding baby, states \"feeding is going well and we're planning to go home.\" Declined further needs at this time. LC will attempt visit tomorrow if patient does not discharge.    Anamaria Bailey, RN-C, IBCLC, MNN, PHN, BSN    "

## 2020-09-16 ENCOUNTER — OFFICE VISIT (OUTPATIENT)
Dept: OBGYN | Facility: CLINIC | Age: 35
End: 2020-09-16
Payer: COMMERCIAL

## 2020-09-16 ENCOUNTER — NURSE TRIAGE (OUTPATIENT)
Dept: NURSING | Facility: CLINIC | Age: 35
End: 2020-09-16

## 2020-09-16 VITALS
BODY MASS INDEX: 22.23 KG/M2 | HEART RATE: 60 BPM | HEIGHT: 66 IN | DIASTOLIC BLOOD PRESSURE: 69 MMHG | SYSTOLIC BLOOD PRESSURE: 107 MMHG

## 2020-09-16 DIAGNOSIS — O92.79 OTHER DISORDERS OF LACTATION: ICD-10-CM

## 2020-09-16 DIAGNOSIS — Z86.59 HISTORY OF DEPRESSION: ICD-10-CM

## 2020-09-16 DIAGNOSIS — O92.29 POSTPARTUM NIPPLE PAIN: Primary | ICD-10-CM

## 2020-09-16 PROBLEM — F33.1 MODERATE EPISODE OF RECURRENT MAJOR DEPRESSIVE DISORDER (H): Status: RESOLVED | Noted: 2018-08-13 | Resolved: 2020-09-16

## 2020-09-16 PROCEDURE — 99205 OFFICE O/P NEW HI 60 MIN: CPT | Performed by: ADVANCED PRACTICE MIDWIFE

## 2020-09-16 ASSESSMENT — EDINBURGH POSTNATAL DEPRESSION SCALE (EPDS)
I HAVE BEEN SO UNHAPPY THAT I HAVE BEEN CRYING: ONLY OCCASIONALLY
I HAVE BLAMED MYSELF UNNECESSARILY WHEN THINGS WENT WRONG: NOT VERY OFTEN
I HAVE FELT SAD OR MISERABLE: NOT VERY OFTEN
THINGS HAVE BEEN GETTING ON TOP OF ME: NO, MOST OF THE TIME I HAVE COPED QUITE WELL
I HAVE FELT SCARED OR PANICKY FOR NO GOOD REASON: NO, NOT AT ALL
TOTAL SCORE: 6
I HAVE BEEN SO UNHAPPY THAT I HAVE HAD DIFFICULTY SLEEPING: NOT AT ALL
I HAVE LOOKED FORWARD WITH ENJOYMENT TO THINGS: AS MUCH AS I EVER DID
THE THOUGHT OF HARMING MYSELF HAS OCCURRED TO ME: NEVER
I HAVE BEEN ANXIOUS OR WORRIED FOR NO GOOD REASON: YES, SOMETIMES
I HAVE BEEN ABLE TO LAUGH AND SEE THE FUNNY SIDE OF THINGS: AS MUCH AS I ALWAYS COULD

## 2020-09-16 ASSESSMENT — PAIN SCALES - GENERAL: PAINLEVEL: NO PAIN (0)

## 2020-09-16 NOTE — PATIENT INSTRUCTIONS
"Plan:   1.  Use good positioning for deep latch, with baby held close to body and baby's head/shoulders/hips in good alignment.  Use a pillow to help bring baby close to breasts, and stepstool to elevate your knees above hips.  Consider using the sidelying position for greater rest and comfort.   2.  Present breast in the \"sandwich\" hold, compressing breast vertically and in line with baby's mouth, for baby to get a larger mouthful of breast and a deeper latch.  If there is feel pinching or pain, stop, use a finger to break the suction, remove baby from the breast and try again until there is no pain with nursing.  There is sometimes a little pain when the baby first begins sucking, but after the first few seconds there should be no pain--only a tugging feeling.  Do not continue with the same position if nursing is painful;  Always restart!    3.  To continue to nurse baby on cue, 8-12 times each day.  Feed on one side until baby finishes swallowing.  Once swallowing slows, use breast compression to encourage more swallowing, but once there is no more active swallowing, and baby is either sleeping, coming off the breast, or just \"nibbling,\" it is OK to use a finger to take baby off the breast and move to the other breast.  Do the same on the other side.  Offer both breasts at each feeding.  It is more important to watch the baby than the clock!   4.  Use nipple shield to help with feeding comfort until nipples are healed.  Also apply All-Purpose Nipple cream after every feeding.  5.  When pumping, use only the lowest setting, and you can use a little bit of coconut oil inside the flange to help make pumping more comfortable.    6.  Yeni needs about 21-22 oz of milk each day to grow well. If she nurses at home as she did in the office today, about 8 times/day, she needs about 15 extra ounces per day in supplementation,(about 1.5 - 1.75 oz each feeding) using your pumped breastmilk. You can give this after feedings, " or distributed throughout the day according to her/his feeding cues.  7.  Call pediatric provider regarding lack of stools;  See provider next week as planned, and lactation in one week.      --------------------------------------------------------------------------------------------------  All Purpose Nipple Ointment (APNO), adapted from Dr. Cam Guo's website  Most 24 hour [facility] stores are [facility]. 1-15 on this list are 24 hour [facility] stores. The cost can sometimes be between $90-$125. If you find that with your insurance it is unaffordable, *please let us know* and we can discuss an alternative!  It can take up to 24 hours to make APNO.   We call our nipple ointment  all purpose  since it contains ingredients that help deal with multiple causes or aggravating factors of sore nipples.  Good medicine  calls for the single  right  treatment for the  right  problem, true enough, but mothers with sore nipples don't have time to try out different treatments that may or may not work, so we have combined various treatments in one ointment. Of course, preventing sore nipples in the first place would be the best treatment and often adjusting how the baby takes the breast can do more than anything to decrease and eliminate the mother's nipple soreness     The APNO contains:  Mupirocin 2% ointment. Mupirocin (Bactroban is the trade name) is an antibiotic that is effective against many bacteria, particularly Staphylococcus aureus including MRSA (methicillin resistant Staphylococcus aureus). Staphylococcus aureus is commonly found growing in abrasions or cracks in the nipples and probably makes worse whatever the initial cause of sore nipples is. Interestingly, mupirocin apparently has some effect against Candida albicans (commonly called  thrush  or  yeast ).  Treatment of sore nipples with an antibiotic alone sometimes seems to work, but we feel that the antibiotic works best in combination with the  other ingredients discussed below. Although mupirocin is absorbed when taken by mouth, it is so quickly metabolized in the body that it is destroyed before blood levels can be measured. **Moreover most of it gets stuck to the skin so that very little is taken in by the baby. Thus it is safe for the baby to swallow if indeed he gets any.**    Betamethasone 0.1% ointment. Betamethasone is a corticosteroid, which like all corticosteroids, decreases inflammation. A large part of the pain mothers experience when they have sore nipples is due to inflammation. The redness of the nipples and areolas is another sign of inflammation. By decreasing the inflammation, the APNO also decreases the pain the mother feels. Most of the betamethasone in the ointment is absorbed into the skin by the mother, so that the baby takes in very little.    Miconazole powder: Miconazole is an antifungal agent. It is very effective against Candida albicans. We feel the concentration of 2% miconazole is a good one, but because the pharmacist adds a powder to the above two ingredients, s/he can change the concentration of miconazole so that it can be increased to 3% or even 4% or decreased to less than 2%. We feel 2% is the best concentration for most situations. Fluconazole powder to 2% may be substituted for miconazole and so can clotrimazole powder to 2%, but we believe that clotrimazole (Canesten) irritates more than the other drugs in the same family. Miconazole cream or gel cannot be substituted for miconazole powder as the compound will usually separate. If you live in a place where miconazole or any of the above mentioned drugs (fluconazole, clotrimazole) are not easily available as powders to be mixed into the APNO, it is probably better to use only the mupirocin and betamethasone ointments mixed together than add a cream or gel or nystatin ointment for example.    By using a powder, the concentration of the other two ingredients is not as  decreased as they would be if another ointment were used for the anti-fungal agent (for example, nystatin ointment). Thus, in the above preparation the concentration of the betamethasone become 0.05% (due to combination with the mupirocin) and the mupirocin concentration is decreased to 1%. Note that nystatin ointment, which we used to use and which decreases the concentration of the other ingredients, is far inferior to miconazole and also tastes bad.    NO SUBSTITUTIONS   How do I use the ointment?  Apply it sparingly after each feeding.  Sparingly  means that you apply just enough to make the nipples and areola glossy or shiny. Do not wash it off or wipe it off, even if the baby comes back to the breast earlier than expected.    How long can I use the ointment?  Somehow the  word  has gotten around to use the ointment for only two weeks. This is unfortunate since many mothers are getting so much better, but not pain free, by the time they believe they have to stop the ointment. Apparently pharmacists have said that the steroid in the ointment will cause  thinning  of the skin. This is a concern with any steroid one puts on the skin, but in our experience this has not occurred with our ointment and many mothers have used it for months.  However, any drug should be used for the shortest period of time necessary, whether it's taken by mouth or put on the skin or any other way it's being given. The same is true for our ointment. There is no problem using the ointment for 2 or 3 or even more weeks, but if you still need the ointment after two or three weeks, or you pain returns after you have stopped it, you should get  hands on  help again to find out why and fix the way the baby is taking the breast, for example. Indeed, the most important feature of decreasing nipple pain is getting the best latch possible.     Watch the video clips at the website and [employer].  Questions? First look at the website [employer]ca or  "[employer].  All purpose nipple ointment, 2009 ????? All of our information sheets may be copied and distributed without further permission on the condition that it is not used in ANY context that violates the? WHO International Code on the Marketing of Breastmilk Substitutes (1981) and subsequent World Health Assembly resolutions. If you don't know what this means, please email us to ask!   ?    Prescription sent to:    [address]  [place], [place] [id number] [phone]    _____________    For an excellent video on paced bottle feeding:  http://www.lowmiMindMixerupply.org-paced-feeding/    ________________.    Nipple comfort:    Beaugen breast pump cushions:  very soft, stretchy plastic cushions that fit in pump flanges to make pumping more comfortable.  Can also help with fit if between flange sizes.  Work best with flange sizes between 21 and 27 mm.  Will reduce flange size by 2 mm, so use a larger flange if needed.  Should be replaced monthly.  No silicon.  $20/pr. Www.eLama.com.    ______________________    Good breastfeeding resources:  www.[employer].TV  Https://www.[employer]/blog/    The Baby Book: Dr. Joseph and Rosa Ospina  The Womanly Art of Breastfeeding by LLL    ____________    For excellent video on positioning for good latch:      [email or web address]/[email or web address]  (click on \"attachment\")    "

## 2020-09-16 NOTE — TELEPHONE ENCOUNTER
Patient calling for lactation consultant.    Transferred.    Lizy Cheng RN  [facility] Nurse Advisor

## 2020-09-16 NOTE — PROGRESS NOTES
"Assessment:   1.  Infant gaining weight well on exclusive breastmilk feeding:  Returned to birthweight at one week  2.  Inadequate stool output in spite of good weight gain  3.  Baby able to latch deeply to breast after assistance with positioning, but remains painful due to severe nipple trauma.  Good suck but low milk transfer today;  Unsure if due to use of nipple shield or mother's stress/tension/pain  4.  Mother with severe nipple trauma due to shallow latch and overly high suction on breast pump  5.  Mother with normal milk supply    Plan:   1. Demonstrated good positioning for deep latch, with baby held close to body and baby's head/shoulders/hips in good alignment.  Encouraged use of pillow to help bring baby close to breasts, and stepstool to elevate mother's knees above hips.   Consider using the sidelying position for greater rest and comfort.   2.  Demonstrated how to present breast in the \"sandwich\" hold, compressing breast vertically and in line with baby's mouth, for baby to get a larger mouthful of breast and a deeper latch.  If there is feel pinching or pain, stop, use a finger to break the suction, remove baby from the breast and try again until there is no pain with nursing.  There is sometimes a little pain when the baby first begins sucking, but after the first few seconds there should be no pain--only a tugging feeling.  Do not continue with the same position if nursing is painful;  Always restart!    3.  To continue to nurse baby on cue, 8-12 times each day.  Feed on one side until baby finishes swallowing.  Once swallowing slows, use breast compression to encourage more swallowing, but once there is no more active swallowing, and baby is either sleeping, coming off the breast, or just \"nibbling,\" it is OK to use a finger to take baby off the breast and move to the other breast.  Do the same on the other side.  Offer both breasts at each feeding.  It is more important to watch the baby than " the clock!   4.  Demonstrated how to use nipple shield to help with feeding comfort until nipples are healed.  Also apply All-Purpose Nipple cream after every feeding.  Given prescription and instructions on how to use.  5.  Advised when pumping, to use only the lowest setting.  Also suggested can use a little bit of coconut oil inside the flange to help make pumping more comfortable.    6.  Explained that Yeni needs about 21-22 oz of milk each day to grow well. If she nurses at home as she did in the office today, about 8 times/day, she needs about 15 extra ounces per day in supplementation,(about 1.5 - 1.75 oz each feeding) using your pumped breastmilk. You can give this after feedings, or distributed throughout the day according to her/his feeding cues.  Reassured that this need will decrease once her efficiency at breast improves.  7.  To call pediatric provider regarding lack of stools;  See provider next week as planned, and lactation in one week.      Subjective: Yeni is here today because of painful feeding.  She is extremely uncomfortable and tearful on my entrance to the room.  She reports that her nipples were very dry and cracked from the beginning, and then when milk came in became intolerably painful.  At that time moved to exclusive pumping, which actually worsened nipple damage:  Nipples developed water-filled blisters in addition to scabbing already present.   She is using the [employer] all-in-one pump.  She now cannot tolerate any touch on her nipples, and is also uncomfortable with areas of fullness in her breasts.  Yeni does note one particular lump that has been present since pregnancy, in the upper outer quadrant of her left breast--is now martins and more painful since her milk came in.  Finally, Yeni is concerned because baby Kin has not yet passed any significant stool since being home--passed meconium at hospital, but since being home has only passed gas and very small amounts of brownish  material--no actual full breastmilk stool.      Relevant History: Uncomplicated birth    Breast Surgery: none    Breastfeeding Goals: exclusive breastfeeding    Previous Breastfeeding Experience: first baby    Infant's name: Kin   Infant's bday: [birth year]   Gestational age: 39  Infant's birth weight: 8# 2.9 oz   Most recent weight on 9/11: 7# 11 oz     Mode of delivery: vaginal   Infant's MD: Dr. Orantes, [facility].  Yeni gives her permission for today's note to be forwarded to Dr. Orantes.  GILLIAN signed and filed in Yeni's chart as Kin has no local active pediatric chart.    Frequency and duration of feedings: every 3-4 hours, by pumped milk in bottles last three days  Swallows audible per mother: yes, when was at breast  Numbers of feedings in 24 hours: 8-9  Number urines per day: 7-9  Number of stools per day and their color: 7-9, but very small and brown--no loose yellow stools yet    Supplementation: giving about 2 oz every 3 hours  Pumping: every 3 hours, obtaining 2-4 oz from both breasts together    Objective/Physical exam:   Mother: Noticed breasts grew larger and areolas darkened during pregnancy and she noticed primary engorgement when her milk came in on day 4    Her nipples are everted, the areola is compressible, the breasts are firm and very full.  Firm, tender area at about 10 o'clock upper outer quadrant of right breast, oval, about 2.5 cm x  1 cm.  No redness.      Sore nipples: yes.  Both nipples cracked and crusted, scabbed.  Right nipple has whitish discharge on face.  Left nipple has large, water-filled blister at about 6 o'clock.      EPDS: 6    Assessment of infant: 70.07% Weight for age percentile   Age today: one week  Today's weight: 8# 3 oz  Amount of milk transferred from LEFT side: 0.7 oz   Amount of milk transferred from RIGHT side: none    Baby has full flexion of arms and legs, normal tone, behavior is alert and active, respirations are normal, skin is normal, hydration is  "normal, jaw is normal size and alignment, palate is normal, frenulum is normal, baby can lateralize tongue, has adequate tongue lift, and tongue can protrude past bottom gum line.    Baby thrush: none    Jaundice: none      Feeding assessment: Baby can hold suction with tongue while at the breast but pain was intolerable for mother.  Applied nipple shield to re-attempt direct breastfeeding, as pumping is also intolerably painful.  Baby was able to hold suction with nipple shield in place, and pain was reduced to about 6/10.  Then repositioned from cross cradle to sidelying position, with significant reduction in pain to 3/10.      Alignment: The baby was flex relaxed. Baby's head was aligned with its trunk. Baby did face mother. Baby was in cross cradle and sidelying positions today.     Areolar Grasp: Baby was able to open mouth wide. Baby's lips were not pursed. Baby's lips did flange outward. Tongue was not easily visible just barely over bottom lip. Baby had complete seal.     Areolar Compression: Baby made rhythmic motion. There were no clicking or smacking sounds. There was nipple discomfort as noted. Nipples appeared rounded after feeding, but blisters were reduced.      Audible swallowing: Baby made some quiet sounds of swallowing: There was an increase in frequency after milk ejection reflex. The milk ejection reflex is normal and milk supply is normal.     /69 (BP Location: Left arm, Patient Position: Sitting, Cuff Size: Adult Regular)   Pulse 60   Ht 1.676 m (5' 6\")   Breastfeeding Yes   BMI 22.23 kg/m      Current Outpatient Medications:      albuterol (PROAIR HFA/PROVENTIL HFA/VENTOLIN HFA) 108 (90 Base) MCG/ACT inhaler, Inhale 2 puffs into the lungs every 6 hours as needed for shortness of breath / dyspnea or wheezing, Disp: 1 Inhaler, Rfl: 11     APNO CREA ointment, Apply 1 g topically every hour as needed for skin care, Disp: 30 g, Rfl: 0     Multiple Vitamin (MULTIVITAMIN) per tablet, Take " 1 tablet by mouth daily., Disp: , Rfl:   Past Medical History:   Diagnosis Date     Abnormal Pap smear and cervical HPV (human papillomavirus) 2011    didn't do colp     Allergic rhinitis due to other allergen      Depressive disorder     not treated now, feels well     WILBER (generalised anxiety disorder) 9/24/2012     Other closed fractures of distal end of radius (alone)      Uncomplicated asthma     exercise induced     Varicella without mention of complication     Before the age of 1     Past Surgical History:   Procedure Laterality Date     HC CLOSED TX RAD/ULNAR SHAFT FX W/ MANIP  3/30/90    [facility]       Family History   Problem Relation Age of Onset     Depression Mother      Allergies Mother      Circulatory Mother      Mental Illness Mother      Respiratory Father         Asthma     Depression Father      Allergies Father      Pacemaker Father      Cerebrovascular Disease Father      Mental Illness Father      Mental Illness Sister      Heart Disease Maternal Grandmother      Alcohol/Drug Maternal Grandmother      Mental Illness Maternal Grandmother      Genitourinary Problems Maternal Grandfather         Kidney stones     Arthritis Maternal Grandfather      Cerebrovascular Disease Maternal Grandfather      Mental Illness Maternal Grandfather      Alcohol/Drug Paternal Grandmother      Aneurysm Paternal Grandmother      Mental Illness Paternal Grandmother      Mental Illness Paternal Grandfather      Allergies Sister      Mental Illness Sister      Aneurysm Paternal Uncle        TT 60 min >50% counseling and education  Maggie Ackerman, APRN, CNM, IBCLC

## 2020-09-23 ENCOUNTER — OFFICE VISIT (OUTPATIENT)
Dept: OBGYN | Facility: CLINIC | Age: 35
End: 2020-09-23
Payer: COMMERCIAL

## 2020-09-23 VITALS
HEART RATE: 79 BPM | WEIGHT: 155 LBS | SYSTOLIC BLOOD PRESSURE: 112 MMHG | DIASTOLIC BLOOD PRESSURE: 74 MMHG | BODY MASS INDEX: 25.02 KG/M2

## 2020-09-23 DIAGNOSIS — O92.79 OTHER DISORDERS OF LACTATION: ICD-10-CM

## 2020-09-23 DIAGNOSIS — N63.10 BREAST MASS, RIGHT: Primary | ICD-10-CM

## 2020-09-23 PROCEDURE — 99215 OFFICE O/P EST HI 40 MIN: CPT | Performed by: ADVANCED PRACTICE MIDWIFE

## 2020-09-23 NOTE — PROGRESS NOTES
Assessment:   1.  2 week old infant gaining weight on exclusive breastmilk feedin.7 oz /day over last week  2. Baby able to latch well to breast today without use of shield, good suck and low milk transfer, but fed shortly before visit  3.  Mother with good milk supply  4.  Nipple trauma completely resolved  5.  Breast mass right side, firm and nontender, unchanged since late pregnancy    Plan:  1.  To continue to feed Kin on cue, working on weaning from the nipple shield.  Given written tips on weaning from shield.   2.  Advised to slowly wean off of the All-Purpose Nipple Cream  3.  Discussed breast mass:  Explained does not palpate like plugged duct, but may be galactocele.  Recommended ultrasound for clearer diagnosis.  Ordered;  Given instructions for making appointment.   4.  See pediatric provider in 2-4 weeks as planned, and lactation as needed      Subjective: Yeni is here today for a follow up visit.  She was seen last week with extreme nipple trauma and breast engorgement.  Baby Kin had been gaining weight well on exclusively pumped milk (returned to birthweight at one week) but  having very minimal stooling.  Baby had low milk transfer directly at breast.  At that visit a plan was made to work on positioning for deeper latch, use nipple shield for feeding if needed to make nursing tolerable, and use low suction with breast pump to supply baby with needed supplements of pumped milk.  Was also to discuss lack of stooling with pediatric provider.  Today Yeni reports that she is feeling much better--nipples are completely healed!  Feeding at breast using the shield, which is going well, using primarily the sidelying position.  Has stopped pumping and supplementing as feeding is going so well.  Breasts are less engorged, but firm area that was present during pregnancy and tender last week is still present and unchanged, although less tender again now that breasts are no longer engorged.  Baby is  is still passing few stools (2 in last week), this was discussed with pediatrician at today's visit and he will follow up.    Relevant History: Uncomplicated birth    Breast Surgery: none    Breastfeeding Goals: exclusive breastfeeding    Previous Breastfeeding Experience: first baby    Infant's name: Kin   Infant's bday: [birth year]   Gestational age: 39  Infant's birth weight: 8# 2.9 oz   Weight on 9/11: 7# 11 oz   Most recent weight on this scale:  8# 3 oz one week ago    Mode of delivery: vaginal   Infant's MD: Dr. Orantes, [facility].  Yeni gives her permission for today's note to be forwarded to Dr. Orantes.  GILLIAN signed and filed in Yeni's chart as Kin has no local active pediatric chart.    Frequency and duration of feedings: every 2 hours,   Swallows audible per mother: yes, when was at breast  Numbers of feedings in 24 hours: 10-12  Number urines per day: 8-10  Number of stools per day and their color: 2 times since last visit, large, yellow-brown    Supplementation: none   Pumping:none     Objective/Physical exam:   Mother: Noticed breasts grew larger and areolas darkened during pregnancy and she noticed primary engorgement when her milk came in on day 4    Her nipples are everted, the areola is compressible, the breasts are soft and full.  Firm area at about 10 o'clock upper outer quadrant of right breast, oval, about 2.5 cm x  1 cm, is unchanged.  No redness.  Yeni reiterates that this has been unchanged since last trimester of pregnancy.    Sore nipples: no  EPDS: not completed today    Assessment of infant: 63.78% Weight for age percentile   Age today: two weeks   Today's weight: 8# 8 oz   Amount of milk transferred from LEFT side: 1 oz   Amount of milk transferred from RIGHT side: none    Baby has full flexion of arms and legs, normal tone, behavior is alert and active, respirations are normal, skin is normal, hydration is normal, jaw is normal size and alignment, palate is normal, frenulum is  normal, baby can lateralize tongue, has adequate tongue lift, and tongue can protrude past bottom gum line.    Baby thrush: none    Jaundice: none      Feeding assessment: Baby can hold suction with tongue while at the breast without using nipple shield today.    Alignment: The baby was flex relaxed. Baby's head was aligned with its trunk. Baby did face mother. Baby was in cross cradle position today.     Areolar Grasp: Baby was able to open mouth wide. Baby's lips were not pursed. Baby's lips did flange outward. Tongue was not easily visible just barely over bottom lip. Baby had complete seal.     Areolar Compression: Baby made rhythmic motion. There were no clicking or smacking sounds. There was nipple discomfort as noted. Nipples appeared rounded after feeding.     Audible swallowing: Baby made some quiet sounds of swallowing: There was an increase in frequency after milk ejection reflex. The milk ejection reflex is normal and milk supply is normal.     /74   Pulse 79   Wt 70.3 kg (155 lb)   BMI 25.02 kg/m      Current Outpatient Medications:      albuterol (PROAIR HFA/PROVENTIL HFA/VENTOLIN HFA) 108 (90 Base) MCG/ACT inhaler, Inhale 2 puffs into the lungs every 6 hours as needed for shortness of breath / dyspnea or wheezing, Disp: 1 Inhaler, Rfl: 11     APNO CREA ointment, Apply 1 g topically every hour as needed for skin care, Disp: 30 g, Rfl: 0     Multiple Vitamin (MULTIVITAMIN) per tablet, Take 1 tablet by mouth daily., Disp: , Rfl:   Past Medical History:   Diagnosis Date     Abnormal Pap smear and cervical HPV (human papillomavirus) 2011    didn't do colp     Allergic rhinitis due to other allergen      Depressive disorder     not treated now, feels well     WILBER (generalised anxiety disorder) 9/24/2012     Other closed fractures of distal end of radius (alone)      Uncomplicated asthma     exercise induced     Varicella without mention of complication     Before the age of 1     Past Surgical  History:   Procedure Laterality Date     HC CLOSED TX RAD/ULNAR SHAFT FX W/ MANIP  3/30/90    (R[facility] closed reduction [facility]       Family History   Problem Relation Age of Onset     Depression Mother      Allergies Mother      Circulatory Mother      Mental Illness Mother      Respiratory Father         Asthma     Depression Father      Allergies Father      Pacemaker Father      Cerebrovascular Disease Father      Mental Illness Father      Mental Illness Sister      Heart Disease Maternal Grandmother      Alcohol/Drug Maternal Grandmother      Mental Illness Maternal Grandmother      Genitourinary Problems Maternal Grandfather         Kidney stones     Arthritis Maternal Grandfather      Cerebrovascular Disease Maternal Grandfather      Mental Illness Maternal Grandfather      Alcohol/Drug Paternal Grandmother      Aneurysm Paternal Grandmother      Mental Illness Paternal Grandmother      Mental Illness Paternal Grandfather      Allergies Sister      Mental Illness Sister      Aneurysm Paternal Uncle        TT 60 min >50% counseling and education  Maggie Ackerman, EMMANUEL, CNM, IBCLC

## 2020-09-23 NOTE — PATIENT INSTRUCTIONS
1.  Continue to feed Kin on cue as you have been, working on weaning from the nipple shield  2.  Slowly wean off of the All-Purpose Nipple Cream  3.  Make appointment for breast ultrasound to check on lump in right breast  4.  See pediatric provider in 2-4 weeks as planned, and lactation as needed  ______________________      For weaning from nipple shield, try without the shield when your baby is calm and willing to eat, but not frantic.  Sometimes it works best when they are sleepy, or even nearly asleep. Try some feedings starting without the shield and then moving to using it to finish the feeding, and try some starting the feeding with the shield and then taking it off once baby is more satisfied and calm and the milk is flowing well.   Some feedings may go well without the shield, and some not--don't panic!   Sometimes weaning from the shield can take a few weeks.  Be patient, because it is almost always successful in the end.    ________________    It can sometimes be very stressful to pump the amount of milk needed to supply your baby at  while you are at work. It's also a vicious [place], because the more you worry about it (sit at the pump and worry that not enough is being released) the less likely you are to have a good letdown reflex and release lots of milk!   A few things that have worked for other mothers:     1. Many  babies do not need as much milk is being offered to them, so check with your childcare provider to see how much milk is being warmed up vs. how much milk your baby is actually drinking.  Sometimes on further questioning of the childcare providers, we discover that 5 oz were warmed up, the baby took 3 oz, and the extra 2 were tossed, which is a frustrating waste of your hard-won breastmilk! ? If that is the issue, this can be solved by putting smaller amounts of milk in the bottles. If you want to leave, say, 15 oz of milk for the day, you could leave 5 bottles of 3  oz each rather than 3 bottles of 5 oz each. This can help because then there isn't so much in each bottle to warm up and waste--any bottles that are not used can then just be saved until the next day and you have less pressure to produce more. ?  This technique can also work even if you discover that your baby has been taking the entire bottle--It's important to remember that when taking a bottle, a baby often doesn't have a lot of choice about whether or not to finish a bottle.  If they are awake they have to keep swallowing while the nipple is in their mouth, unlike the breast, where they can control the flow. By using smaller amounts in the bottle, providers may find that your baby is actually satisfied after, say, 3 oz rather than just continuing until the 5 oz is gone.  If baby takes the 3 oz and is still obviously hungry, then another bottle can be warmed, but if she is content, then the bottle is saved. So those are a few thoughts on how to make the best use of the milk that you have.     2. Another approach is to try to increase the amount of milk to send to childcare. One way to do that is to pump a little bit while you are at home. If your baby does not always take both breasts, you could pump the breast that she is not nursing from while she is feeding from the other. This is a great way to get a good letdown and get more milk, because we often have a better letdown to our baby than we do to the pump (because pumping is not as inspiring and we often do not let down as well to our pump as we do to our baby). Some women add a pumping first thing in the morning before the baby has fed, because our breasts are often most full in the mornings. Other women add a few pumpings over the weekends, just to have that extra. It may not be feasible in your workday, but some women can squeeze in another pumping session at work. Another idea is to pump one breast at a time rather double pumping, and use the free hand to  massage the breast being pumped--research has shown that that can significantly increase the output of milk. You can also do a little hand expression after the electric pumping and that has been shown can get some additional milk as well. ?     3. A third angle is to minimize the number of bottles your baby needs while you are gone. Try nursing immediately before going to work, either at home right before going out the door, or even at the childcare if possible, and asking your childcare provider not to feed her for the last hour before your arrive back, so that you can nurse again immediately upon your return. Sometimes this can save on an entire bottle during the day. For older babies, you can also have the childcare provider offer solid foods instead of a bottle feeding of milk. This can decrease this amount of milk that is needed during the day, either by substituting the feeding of solids for a milk feeding, or by decreasing the amount of milk that is needed at that feeding.    _____________________    For excellent information on maintaining milk supply, see this article:     [email or web address]

## 2020-11-16 ENCOUNTER — HEALTH MAINTENANCE LETTER (OUTPATIENT)
Age: 35
End: 2020-11-16

## 2021-09-18 ENCOUNTER — HEALTH MAINTENANCE LETTER (OUTPATIENT)
Age: 36
End: 2021-09-18

## 2022-01-08 ENCOUNTER — HEALTH MAINTENANCE LETTER (OUTPATIENT)
Age: 37
End: 2022-01-08

## 2022-02-17 PROBLEM — Z30.432 ENCOUNTER FOR IUD REMOVAL: Status: RESOLVED | Noted: 2019-06-27 | Resolved: 2020-09-16

## 2022-11-20 ENCOUNTER — HEALTH MAINTENANCE LETTER (OUTPATIENT)
Age: 37
End: 2022-11-20

## 2023-04-15 ENCOUNTER — HEALTH MAINTENANCE LETTER (OUTPATIENT)
Age: 38
End: 2023-04-15

## 2023-08-08 ENCOUNTER — APPOINTMENT (OUTPATIENT)
Dept: URBAN - METROPOLITAN AREA CLINIC 254 | Age: 38
Setting detail: DERMATOLOGY
End: 2023-08-08

## 2023-08-08 VITALS — WEIGHT: 145 LBS | HEIGHT: 65 IN

## 2023-08-08 DIAGNOSIS — L81.8 OTHER SPECIFIED DISORDERS OF PIGMENTATION: ICD-10-CM

## 2023-08-08 DIAGNOSIS — Z71.89 OTHER SPECIFIED COUNSELING: ICD-10-CM

## 2023-08-08 DIAGNOSIS — D22 MELANOCYTIC NEVI: ICD-10-CM

## 2023-08-08 DIAGNOSIS — D18.0 HEMANGIOMA: ICD-10-CM

## 2023-08-08 DIAGNOSIS — L91.8 OTHER HYPERTROPHIC DISORDERS OF THE SKIN: ICD-10-CM

## 2023-08-08 PROBLEM — D18.01 HEMANGIOMA OF SKIN AND SUBCUTANEOUS TISSUE: Status: ACTIVE | Noted: 2023-08-08

## 2023-08-08 PROBLEM — D22.61 MELANOCYTIC NEVI OF RIGHT UPPER LIMB, INCLUDING SHOULDER: Status: ACTIVE | Noted: 2023-08-08

## 2023-08-08 PROBLEM — D22.72 MELANOCYTIC NEVI OF LEFT LOWER LIMB, INCLUDING HIP: Status: ACTIVE | Noted: 2023-08-08

## 2023-08-08 PROBLEM — D22.4 MELANOCYTIC NEVI OF SCALP AND NECK: Status: ACTIVE | Noted: 2023-08-08

## 2023-08-08 PROBLEM — D23.72 OTHER BENIGN NEOPLASM OF SKIN OF LEFT LOWER LIMB, INCLUDING HIP: Status: ACTIVE | Noted: 2023-08-08

## 2023-08-08 PROBLEM — D22.62 MELANOCYTIC NEVI OF LEFT UPPER LIMB, INCLUDING SHOULDER: Status: ACTIVE | Noted: 2023-08-08

## 2023-08-08 PROCEDURE — OTHER MIPS QUALITY: OTHER

## 2023-08-08 PROCEDURE — OTHER COUNSELING: OTHER

## 2023-08-08 PROCEDURE — OTHER MONITORING: OTHER

## 2023-08-08 PROCEDURE — 99203 OFFICE O/P NEW LOW 30 MIN: CPT

## 2023-08-08 ASSESSMENT — LOCATION DETAILED DESCRIPTION DERM
LOCATION DETAILED: RIGHT INFERIOR FRONTAL SCALP
LOCATION DETAILED: LEFT POSTERIOR SHOULDER
LOCATION DETAILED: LEFT RIB CAGE
LOCATION DETAILED: RIGHT ANTERIOR DISTAL UPPER ARM
LOCATION DETAILED: RIGHT SUPERIOR MEDIAL UPPER BACK
LOCATION DETAILED: INFERIOR LUMBAR SPINE
LOCATION DETAILED: RIGHT ANTERIOR MEDIAL PROXIMAL THIGH
LOCATION DETAILED: LEFT ANTERIOR DISTAL THIGH

## 2023-08-08 ASSESSMENT — LOCATION SIMPLE DESCRIPTION DERM
LOCATION SIMPLE: LEFT SHOULDER
LOCATION SIMPLE: RIGHT THIGH
LOCATION SIMPLE: LEFT THIGH
LOCATION SIMPLE: RIGHT UPPER BACK
LOCATION SIMPLE: SCALP
LOCATION SIMPLE: ABDOMEN
LOCATION SIMPLE: RIGHT UPPER ARM
LOCATION SIMPLE: LOWER BACK

## 2023-08-08 ASSESSMENT — LOCATION ZONE DERM
LOCATION ZONE: TRUNK
LOCATION ZONE: LEG
LOCATION ZONE: SCALP
LOCATION ZONE: ARM

## 2023-08-08 NOTE — PROCEDURE: MONITORING
Detail Level: Detailed
Patient Specific Counseling (Will Not Stick From Patient To Patient): Discussed with patient that we will continue to monitor this mole at future office visits for changes.

## 2024-06-16 ENCOUNTER — HEALTH MAINTENANCE LETTER (OUTPATIENT)
Age: 39
End: 2024-06-16

## 2024-06-20 ENCOUNTER — OFFICE VISIT (OUTPATIENT)
Dept: FAMILY MEDICINE | Facility: CLINIC | Age: 39
End: 2024-06-20
Payer: COMMERCIAL

## 2024-06-20 VITALS
RESPIRATION RATE: 16 BRPM | HEART RATE: 59 BPM | OXYGEN SATURATION: 96 % | WEIGHT: 148.6 LBS | DIASTOLIC BLOOD PRESSURE: 68 MMHG | HEIGHT: 66 IN | TEMPERATURE: 97.7 F | SYSTOLIC BLOOD PRESSURE: 101 MMHG | BODY MASS INDEX: 23.88 KG/M2

## 2024-06-20 DIAGNOSIS — Z00.00 ROUTINE GENERAL MEDICAL EXAMINATION AT A HEALTH CARE FACILITY: Primary | ICD-10-CM

## 2024-06-20 DIAGNOSIS — Z11.3 SCREEN FOR STD (SEXUALLY TRANSMITTED DISEASE): ICD-10-CM

## 2024-06-20 DIAGNOSIS — Z12.4 CERVICAL CANCER SCREENING: ICD-10-CM

## 2024-06-20 DIAGNOSIS — Z97.5 IUD (INTRAUTERINE DEVICE) IN PLACE: ICD-10-CM

## 2024-06-20 DIAGNOSIS — L50.1 CHRONIC IDIOPATHIC URTICARIA: ICD-10-CM

## 2024-06-20 DIAGNOSIS — K58.2 IRRITABLE BOWEL SYNDROME WITH BOTH CONSTIPATION AND DIARRHEA: ICD-10-CM

## 2024-06-20 DIAGNOSIS — F41.9 ANXIETY: ICD-10-CM

## 2024-06-20 DIAGNOSIS — G44.219 EPISODIC TENSION-TYPE HEADACHE, NOT INTRACTABLE: ICD-10-CM

## 2024-06-20 LAB
C TRACH DNA SPEC QL PROBE+SIG AMP: NEGATIVE
HCV AB SERPL QL IA: NONREACTIVE
HIV 1+2 AB+HIV1 P24 AG SERPL QL IA: NONREACTIVE
N GONORRHOEA DNA SPEC QL NAA+PROBE: NEGATIVE
T PALLIDUM AB SER QL: NONREACTIVE

## 2024-06-20 PROCEDURE — G0145 SCR C/V CYTO,THINLAYER,RESCR: HCPCS | Performed by: FAMILY MEDICINE

## 2024-06-20 PROCEDURE — 87491 CHLMYD TRACH DNA AMP PROBE: CPT | Performed by: FAMILY MEDICINE

## 2024-06-20 PROCEDURE — 36415 COLL VENOUS BLD VENIPUNCTURE: CPT | Performed by: FAMILY MEDICINE

## 2024-06-20 PROCEDURE — 86803 HEPATITIS C AB TEST: CPT | Performed by: FAMILY MEDICINE

## 2024-06-20 PROCEDURE — 86780 TREPONEMA PALLIDUM: CPT | Performed by: FAMILY MEDICINE

## 2024-06-20 PROCEDURE — 87389 HIV-1 AG W/HIV-1&-2 AB AG IA: CPT | Performed by: FAMILY MEDICINE

## 2024-06-20 PROCEDURE — 87624 HPV HI-RISK TYP POOLED RSLT: CPT | Performed by: FAMILY MEDICINE

## 2024-06-20 PROCEDURE — 99385 PREV VISIT NEW AGE 18-39: CPT | Performed by: FAMILY MEDICINE

## 2024-06-20 PROCEDURE — 99214 OFFICE O/P EST MOD 30 MIN: CPT | Mod: 25 | Performed by: FAMILY MEDICINE

## 2024-06-20 PROCEDURE — 87591 N.GONORRHOEAE DNA AMP PROB: CPT | Performed by: FAMILY MEDICINE

## 2024-06-20 PROCEDURE — 96127 BRIEF EMOTIONAL/BEHAV ASSMT: CPT | Performed by: FAMILY MEDICINE

## 2024-06-20 RX ORDER — DICYCLOMINE HYDROCHLORIDE 10 MG/1
10 CAPSULE ORAL 4 TIMES DAILY PRN
Qty: 30 CAPSULE | Refills: 3 | Status: SHIPPED | OUTPATIENT
Start: 2024-06-20

## 2024-06-20 RX ORDER — TRIAMCINOLONE ACETONIDE 55 UG/1
2 SPRAY, METERED NASAL DAILY
COMMUNITY

## 2024-06-20 RX ORDER — SUMATRIPTAN 50 MG/1
50 TABLET, FILM COATED ORAL
Qty: 30 TABLET | Refills: 3 | Status: SHIPPED | OUTPATIENT
Start: 2024-06-20

## 2024-06-20 SDOH — HEALTH STABILITY: PHYSICAL HEALTH: ON AVERAGE, HOW MANY DAYS PER WEEK DO YOU ENGAGE IN MODERATE TO STRENUOUS EXERCISE (LIKE A BRISK WALK)?: 6 DAYS

## 2024-06-20 ASSESSMENT — ANXIETY QUESTIONNAIRES
IF YOU CHECKED OFF ANY PROBLEMS ON THIS QUESTIONNAIRE, HOW DIFFICULT HAVE THESE PROBLEMS MADE IT FOR YOU TO DO YOUR WORK, TAKE CARE OF THINGS AT HOME, OR GET ALONG WITH OTHER PEOPLE: SOMEWHAT DIFFICULT
3. WORRYING TOO MUCH ABOUT DIFFERENT THINGS: SEVERAL DAYS
5. BEING SO RESTLESS THAT IT IS HARD TO SIT STILL: NOT AT ALL
1. FEELING NERVOUS, ANXIOUS, OR ON EDGE: NOT AT ALL
GAD7 TOTAL SCORE: 3
7. FEELING AFRAID AS IF SOMETHING AWFUL MIGHT HAPPEN: NOT AT ALL
GAD7 TOTAL SCORE: 3
4. TROUBLE RELAXING: SEVERAL DAYS
7. FEELING AFRAID AS IF SOMETHING AWFUL MIGHT HAPPEN: NOT AT ALL
8. IF YOU CHECKED OFF ANY PROBLEMS, HOW DIFFICULT HAVE THESE MADE IT FOR YOU TO DO YOUR WORK, TAKE CARE OF THINGS AT HOME, OR GET ALONG WITH OTHER PEOPLE?: SOMEWHAT DIFFICULT
GAD7 TOTAL SCORE: 3
6. BECOMING EASILY ANNOYED OR IRRITABLE: SEVERAL DAYS
2. NOT BEING ABLE TO STOP OR CONTROL WORRYING: NOT AT ALL

## 2024-06-20 ASSESSMENT — PAIN SCALES - GENERAL: PAINLEVEL: MODERATE PAIN (5)

## 2024-06-20 ASSESSMENT — PATIENT HEALTH QUESTIONNAIRE - PHQ9
10. IF YOU CHECKED OFF ANY PROBLEMS, HOW DIFFICULT HAVE THESE PROBLEMS MADE IT FOR YOU TO DO YOUR WORK, TAKE CARE OF THINGS AT HOME, OR GET ALONG WITH OTHER PEOPLE: NOT DIFFICULT AT ALL
SUM OF ALL RESPONSES TO PHQ QUESTIONS 1-9: 1
SUM OF ALL RESPONSES TO PHQ QUESTIONS 1-9: 1

## 2024-06-20 ASSESSMENT — SOCIAL DETERMINANTS OF HEALTH (SDOH): HOW OFTEN DO YOU GET TOGETHER WITH FRIENDS OR RELATIVES?: ONCE A WEEK

## 2024-06-20 NOTE — PATIENT INSTRUCTIONS
"Patient Education   Preventive Care Advice   This is general advice we often give to help people stay healthy. Your care team may have specific advice just for you. Please talk to your care team about your own preventive care needs.  Lifestyle  Exercise at least 150 minutes each week (30 minutes a day, 5 days a week).  Do muscle strengthening activities 2 days a week. These help control your weight and prevent disease.  No smoking.  Wear sunscreen to prevent skin cancer.  Have your home tested for radon every 2 to 5 years. Radon is a colorless, odorless gas that can harm your lungs. To learn more, go to www.health.[facility].mn.us and search for \"Radon in Homes.\"  Keep guns unloaded and locked up in a safe place like a safe or gun vault, or, use a gun lock and hide the keys. Always lock away bullets separately. To learn more, visit [employer].org.[place].gov and search for \"safe gun storage.\"  Nutrition  Eat 5 or more servings of fruits and vegetables each day.  Try wheat bread, brown rice and whole grain pasta (instead of white bread, rice, and pasta).  Get enough calcium and vitamin D. Check the label on foods and aim for 100% of the RDA (recommended daily allowance).  Regular exams  Have a dental exam and cleaning every 6 months.  See your health care team every year to talk about:  Any changes in your health.  Any medicines your care team has prescribed.  Preventive care, family planning, and ways to prevent chronic diseases.  Shots (vaccines)   HPV shots (up to age 26), if you've never had them before.  Hepatitis B shots (up to age 59), if you've never had them before.  COVID-19 shot: Get this shot when it's due.  Flu shot: Get a flu shot every year.  Tetanus shot: Get a tetanus shot every 10 years.  Pneumococcal, hepatitis A, and RSV shots: Ask your care team if you need these based on your risk.  Shingles shot (for age 50 and up).  General health tests  Diabetes screening:  Starting at age 35, Get screened for diabetes at least " every 3 years.  If you are younger than age 35, ask your care team if you should be screened for diabetes.  Cholesterol test: At age 39, start having a cholesterol test every 5 years, or more often if advised.  Bone density scan (DEXA): At age 50, ask your care team if you should have this scan for osteoporosis (brittle bones).  Hepatitis C: Get tested at least once in your life.  Abdominal aortic aneurysm screening: Talk to your doctor about having this screening if you:  Have ever smoked; and  Are biologically male; and  Are between the ages of 65 and 75.  STIs (sexually transmitted infections)  Before age 24: Ask your care team if you should be screened for STIs.  After age 24: Get screened for STIs if you're at risk. You are at risk for STIs (including HIV) if:  You are sexually active with more than one person.  You don't use condoms every time.  You or a partner was diagnosed with a sexually transmitted infection.  If you are at risk for HIV, ask about PrEP medicine to prevent HIV.  Get tested for HIV at least once in your life, whether you are at risk for HIV or not.  Cancer screening tests  Cervical cancer screening: If you have a cervix, begin getting regular cervical cancer screening tests at age 21. Most people who have regular screenings with normal results can stop after age 65. Talk about this with your provider.  Breast cancer scan (mammogram): If you've ever had breasts, begin having regular mammograms starting at age 40. This is a scan to check for breast cancer.  Colon cancer screening: It is important to start screening for colon cancer at age 45.  Have a colonoscopy test every 10 years (or more often if you're at risk) Or, ask your provider about stool tests like a FIT test every year or Cologuard test every 3 years.  To learn more about your testing options, visit: www.[employer]/192513.pdf.  For help making a decision, visit: willy/bu35860.  Prostate cancer screening test: If you have a  prostate and are age 55 to 69, ask your provider if you would benefit from a yearly prostate cancer screening test.  Lung cancer screening: If you are a current or former smoker age 50 to 80, ask your care team if ongoing lung cancer screenings are right for you.  For informational purposes only. Not to replace the advice of your health care provider. Copyright   2023 [facility]. All rights reserved. Clinically reviewed by the  [facility]. Compiere [id number] - REV 04/24.  Substance Use Disorder: Care Instructions  Overview     You can improve your life and health by stopping your use of alcohol or drugs. When you don't drink or use drugs, you may feel and sleep better. You may get along better with your family, friends, and coworkers. There are medicines and programs that can help with substance use disorder.  How can you care for yourself at home?  Here are some ways to help you stay sober and prevent relapse.  If you have been given medicine to help keep you sober or reduce your cravings, be sure to take it exactly as prescribed.  Talk to your doctor about programs that can help you stop using drugs or drinking alcohol.  Do not keep alcohol or drugs in your home.  Plan ahead. Think about what you'll say if other people ask you to drink or use drugs. Try not to spend time with people who drink or use drugs.  Use the time and money spent on drinking or drugs to do something that's important to you.  Preventing a relapse  Have a plan to deal with relapse. Learn to recognize changes in your thinking that lead you to drink or use drugs. Get help before you start to drink or use drugs again.  Try to stay away from situations, friends, or places that may lead you to drink or use drugs.  If you feel the need to drink alcohol or use drugs again, seek help right away. Call a trusted friend or family member. Some people get support from organizations such as Narcotics Anonymous or [employer]  [employer] or from treatment facilities.  If you relapse, get help as soon as you can. Some people make a plan with another person that outlines what they want that person to do for them if they relapse. The plan usually includes how to handle the relapse and who to notify in case of relapse.  Don't give up. Remember that a relapse doesn't mean that you have failed. Use the experience to learn the triggers that lead you to drink or use drugs. Then quit again. Recovery is a lifelong process. Many people have several relapses before they are able to quit for good.  Follow-up care is a key part of your treatment and safety. Be sure to make and go to all appointments, and call your doctor if you are having problems. It's also a good idea to know your test results and keep a list of the medicines you take.  When should you call for help?   Call 911  anytime you think you may need emergency care. For example, call if you or someone else:    Has overdosed or has withdrawal signs. Be sure to tell the emergency workers that you are or someone else is using or trying to quit using drugs. Overdose or withdrawal signs may include:  Losing consciousness.  Seizure.  Seeing or hearing things that aren't there (hallucinations).     Is thinking or talking about suicide or harming others.   Where to get help 24 hours a day, 7 days a week   If you or someone you know talks about suicide, self-harm, a mental health crisis, a substance use crisis, or any other kind of emotional distress, get help right away. You can:    Call the Suicide and Crisis Lifeline at 988.     Call 9-007-683-TALK (1-547.131.4188).     Text HOME to 351240 to access the Crisis Text Line.   Consider saving these numbers in your phone.  Go to [employer] for more information or to chat online.  Call your doctor now or seek immediate medical care if:    You are having withdrawal symptoms. These may include nausea or vomiting, sweating, shakiness, and anxiety.  "  Watch closely for changes in your health, and be sure to contact your doctor if:    You have a relapse.     You need more help or support to stop.   Where can you learn more?  Go to https://www.[employer].net/patiented  Enter H573 in the search box to learn more about \"Substance Use Disorder: Care Instructions.\"  Current as of: November 15, 2023               Content Version: 14.0    8402-5667 [employer].   Care instructions adapted under license by your healthcare professional. If you have questions about a medical condition or this instruction, always ask your healthcare professional. [employer] disclaims any warranty or liability for your use of this information.         "

## 2024-06-20 NOTE — PROGRESS NOTES
Preventive Care Visit  [facility]  Annie Guerrier MD, Family Medicine  Jun 20, 2024      Assessment & Plan     1. Routine general medical examination at a health care facility  Vaccinations discussed and patient  deferred COVID today     2. Cervical cancer screening  If NILM - pap ok to repeat in 5 yrs   - Pap Screen with HPV - Recommended Age 30 - 65 Years    3. IUD (intrauterine device) in place  Plan: advised to review the listed date as per OBG/GYN records  Seems like she was given Kylena jan 2021- and that's good for 5 yrs jan 2026    4. Screen for STD (sexually transmitted disease)  Plan:  - Hepatitis C Screen Reflex to HCV RNA Quant and Genotype; Future  - Chlamydia & Gonorrhea by PCR, [facility] - Clinic Collect  - HIV Antigen Antibody Combo; Future  - Treponema Abs w Reflex to RPR and Titer; Future    5. Irritable bowel syndrome with both constipation and diarrhea  Plan: fodmap diet.  Bentyl as needed   See GI    Pathophysiology discussed in detail, as a diagnoses of xclusion   - Adult GI  Referral - Consult Only; Future  - dicyclomine (BENTYL) 10 MG capsule; Take 1 capsule (10 mg) by mouth 4 times daily as needed (IBS cramping/ pain)  Dispense: 30 capsule; Refill: 3    6. Chronic idiopathic urticaria  Plan: see allergist   - Adult Allergy/Asthma  Referral; Future    7. Episodic tension-type headache, not intractable  Episodic tension type headache ? Migraine headache  Keep headache calendar  Avoid triggers  Ok to use imitrex as needed   Potential medication side effects were discussed with the patient; let me know if any occur.  See neurologist for diagnostic clarification      She does not want to be on daily medications to prevent frequent headache     - Adult Neurology  Referral; Future  - SUMAtriptan (IMITREX) 50 MG tablet; Take 1 tablet (50 mg) by mouth at onset of headache for migraine  Dispense: 30 tablet; Refill: 3    Anxiety  Self managed       6/27/2019     8:34 AM 8/12/2019     9:08 AM 6/20/2024     7:49 AM   WILBER-7 SCORE   Total Score   3 (minimal anxiety)   Total Score 0 10 3          Patient has been advised of split billing requirements and indicates understanding: Yes        Counseling  Appropriate preventive services were discussed with this patient, including applicable screening as appropriate for fall prevention, nutrition, physical activity, Tobacco-use cessation, weight loss and cognition.  Checklist reviewing preventive services available has been given to the patient.  Reviewed patient's diet, addressing concerns and/or questions.   She is at risk for psychosocial distress and has been provided with information to reduce risk.       Work on weight loss  Regular exercise    Subjective   Yeni is a 38 year old, presenting for the following:  Physical (Pt is not fasting)        6/20/2024     7:47 AM   Additional Questions   Roomed by RODERICK Chance   Accompanied by n/a         6/20/2024   Forms   Any forms needing to be completed Yes           Health Care Directive  Patient does not have a Health Care Directive or Living Will: Discussed advance care planning with patient; information given to patient to review.    HPI  1.Wants IUD removed and replaced today   Kyleena in place since early 2021   Placed by OBG/GYN     2. Gastroenterology issues,on and off 3 yrs   cramping, diarrhea , often feeling stuffed and constipated alternating with loose stools on and off.  Started taking colo aurelio orally , helps to solidify the stool with regularity but still bloated.  Wondering about leaky gut  She is on dairy free diet    3.hives  On and off hives, on face- sveta if  sanches scratches her face .  All of a sudden for past 2 yrs very sensitive skin , on and off breaks out into hives   Hives last for an hr, and resolve spontaneously on own     4.history of anxiety- stable on L-theanine and excercise help with anxiety a lot  Does not feel that  she needs to be on additional medications or counseling.    5.Self diagnosed migraine   Always been headache prone with sinus issues  Band of headache , all around, triggered by weather, light and noise sensitivity   Mom and sister has migraine medications       2024   General Health   How would you rate your overall physical health? (!) FAIR   Feel stress (tense, anxious, or unable to sleep) Only a little      (!) STRESS CONCERN      2024   Nutrition   Three or more servings of calcium each day? Yes   Diet: Other   If other, please elaborate: dairy   How many servings of fruit and vegetables per day? 4 or more   How many sweetened beverages each day? 0-1            2024   Exercise   Days per week of moderate/strenous exercise 6 days            2024   Social Factors   Frequency of gathering with friends or relatives Once a week   Worry food won't last until get money to buy more No   Food not last or not have enough money for food? No   Do you have housing? (Housing is defined as stable permanent housing and does not include staying ouside in a car, in a tent, in an abandoned building, in an overnight shelter, or couch-surfing.) Yes   Are you worried about losing your housing? No   Lack of transportation? No   Unable to get utilities (heat,electricity)? No            2024   Dental   Dentist two times every year? Yes            2024   TB Screening   Were you born outside of the [place]? No          Today's PHQ-9 Score:       2024     7:48 AM   PHQ-9 SCORE   PHQ-9 Total Score MyChart 1 (Minimal depression)   PHQ-9 Total Score 1         2024   Substance Use   Alcohol more than 3/day or more than 7/wk Not Applicable   Do you use any other substances recreationally? (!) CANNABIS PRODUCTS        Social History     Tobacco Use    Smoking status: Former     Current packs/day: 0.00     Types: Cigarettes     Quit date: 2011     Years since quittin.1    Smokeless tobacco: Never     "Tobacco comments:     marijuana   Vaping Use    Vaping status: Never Used   Substance Use Topics    Alcohol use: Not Currently    Drug use: No          Mammogram Screening - Patient under 40 years of age: Routine Mammogram Screening not recommended.         6/20/2024   STI Screening   New sexual partner(s) since last STI/HIV test? No        History of abnormal Pap smear: No - age 30- 64 PAP with HPV every 5 years recommended        Latest Ref Rng & Units 8/12/2019     9:10 AM 8/12/2019     9:07 AM 2/17/2017     5:28 PM   PAP / HPV   PAP (Historical)   NIL     HPV 16 DNA NEG^Negative Negative   Negative    HPV 18 DNA NEG^Negative Negative   Negative    Other HR HPV NEG^Negative Negative   Negative            6/20/2024   Contraception/Family Planning   Questions about contraception or family planning (!) YES keleena in place, wants removal and replacement, thinks it was advised for 3 yrs            Reviewed and updated as needed this visit by Provider   Tobacco  Allergies  Meds  Problems  Med Hx  Surg Hx  Fam Hx                  Review of Systems  Constitutional, neuro, ENT, endocrine, pulmonary, cardiac, gastrointestinal, genitourinary, musculoskeletal, integument and psychiatric systems are negative, except as otherwise noted.     Objective    Exam  /68 (BP Location: Left arm, Patient Position: Sitting, Cuff Size: Adult Regular)   Pulse 59   Temp 97.7  F (36.5  C) (Temporal)   Resp 16   Ht 1.665 m (5' 5.55\")   Wt 67.4 kg (148 lb 9.6 oz)   SpO2 96%   Breastfeeding No   BMI 24.31 kg/m     Estimated body mass index is 24.31 kg/m  as calculated from the following:    Height as of this encounter: 1.665 m (5' 5.55\").    Weight as of this encounter: 67.4 kg (148 lb 9.6 oz).    Physical Exam  GENERAL: alert and no distress  EYES: Eyes grossly normal to inspection, PERRL and conjunctivae and sclerae normal  HENT: ear canals and TM's normal, nose and mouth without ulcers or lesions  NECK: no adenopathy, " no asymmetry, masses, or scars  RESP: lungs clear to auscultation - no rales, rhonchi or wheezes  BREAST: normal without masses, tenderness or nipple discharge and no palpable axillary masses or adenopathy  CV: regular rate and rhythm, normal S1 S2, no S3 or S4, no murmur, click or rub, no peripheral edema  ABDOMEN: soft, nontender, no hepatosplenomegaly, no masses and bowel sounds normal   (female) w/bimanual: normal female external genitalia, normal urethral meatus, normal vaginal mucosa, and normal cervix/adnexa/uterus without masses or discharge  MS: no gross musculoskeletal defects noted, no edema  SKIN: no suspicious lesions or rashes  NEURO: Normal strength and tone, mentation intact and speech normal  PSYCH: mentation appears normal, affect normal/bright    Answers submitted by the patient for this visit:  Patient Health Questionnaire (Submitted on 6/20/2024)  If you checked off any problems, how difficult have these problems made it for you to do your work, take care of things at home, or get along with other people?: Not difficult at all  PHQ9 TOTAL SCORE: 1  WILBER-7 (Submitted on 6/20/2024)  WILBER 7 TOTAL SCORE: 3        Signed Electronically by: Annie Guerrier MD

## 2024-06-21 LAB
HPV HR 12 DNA CVX QL NAA+PROBE: NEGATIVE
HPV16 DNA CVX QL NAA+PROBE: NEGATIVE
HPV18 DNA CVX QL NAA+PROBE: NEGATIVE
HUMAN PAPILLOMA VIRUS FINAL DIAGNOSIS: NORMAL

## 2024-06-25 LAB
BKR LAB AP GYN ADEQUACY: NORMAL
BKR LAB AP GYN INTERPRETATION: NORMAL
BKR LAB AP LMP: NORMAL
BKR LAB AP PREVIOUS ABNORMAL: NORMAL
PATH REPORT.COMMENTS IMP SPEC: NORMAL
PATH REPORT.COMMENTS IMP SPEC: NORMAL
PATH REPORT.RELEVANT HX SPEC: NORMAL

## 2024-07-10 ENCOUNTER — VIRTUAL VISIT (OUTPATIENT)
Dept: FAMILY MEDICINE | Facility: CLINIC | Age: 39
End: 2024-07-10
Payer: COMMERCIAL

## 2024-07-10 DIAGNOSIS — A08.4 VIRAL GASTROENTERITIS: Primary | ICD-10-CM

## 2024-07-10 PROCEDURE — G2211 COMPLEX E/M VISIT ADD ON: HCPCS | Mod: 95 | Performed by: FAMILY MEDICINE

## 2024-07-10 PROCEDURE — 99213 OFFICE O/P EST LOW 20 MIN: CPT | Mod: 95 | Performed by: FAMILY MEDICINE

## 2024-07-10 RX ORDER — ONDANSETRON 4 MG/1
4 TABLET, ORALLY DISINTEGRATING ORAL EVERY 8 HOURS PRN
Qty: 20 TABLET | Refills: 1 | Status: SHIPPED | OUTPATIENT
Start: 2024-07-10

## 2024-07-10 NOTE — PROGRESS NOTES
Yeni is a 38 year old who is being evaluated via a billable video visit.    How would you like to obtain your AVS? MyChart  If the video visit is dropped, the invitation should be resent by: Text to cell phone: [phone]  Will anyone else be joining your video visit? No      Assessment & Plan     (A08.4) Viral gastroenteritis  (primary encounter diagnosis)  Comment: Previously healthy 38-year-old female who was at her normal state of health up until about 3 days ago.  She is having symptoms consistent with viral gastroenteritis.  We discussed supportive care including trial of Zofran and patient was amenable to accepting of this.  Note was given for work.  Unable to assess vital signs by does not appear to have any gross dehydration on today's visit.  We did discuss reinitiation fluids and oral intake slowly as a means to help her body very acclimated.  We also discussed over-the-counter Emetrol ER and return to clinic precautions discussed for worsening symptoms or signs or symptoms of bacterial infection.  Follow-up urine if symptoms fail to improve or worsen  Plan: ondansetron (ZOFRAN ODT) 4 MG ODT tab              Subjective   Yeni is a 38 year old, presenting for the following health issues:  Vomiting and Diarrhea        7/10/2024     8:21 AM   Additional Questions   Roomed by Leda SALEH     Patient is a previously healthy 38-year-old female with a history of IBS who presents today with 3-day history of vomiting and extreme diarrhea.  She denies any hematemesis or mohit blood in her stool or mucus in her stool but reports that she cannot keep anything down.  She denies any fevers but does report some chills.  She is the only one sick and her family.  They did go to a pool on Sunday approximately 4 days ago.  She denies any new foods.  No known sick contacts.  She reports the symptoms are different from her IBS symptoms.  She is unable to keep even small sips down.    History of Present Illness       Reason  for visit:  Vomitting, unable to keep fluids down  Symptom onset:  1-3 days ago  Symptoms include:  Diarrhea  Symptom intensity:  Moderate  Symptom progression:  Staying the same              Objective    Vitals - Patient Reported  Pain Score: Extreme Pain (9)    Physical exam limited by video visit    Physical Exam  Constitutional:       General: She is not in acute distress.     Appearance: She is not ill-appearing, toxic-appearing or diaphoretic.   HENT:      Head: Normocephalic.      Mouth/Throat:      Mouth: Mucous membranes are moist.   Eyes:      General: No scleral icterus.     Conjunctiva/sclera: Conjunctivae normal.   Pulmonary:      Effort: Pulmonary effort is normal. No respiratory distress.   Neurological:      Mental Status: She is alert. Mental status is at baseline.   Psychiatric:         Behavior: Behavior normal.         Thought Content: Thought content normal.         Judgment: Judgment normal.                  Video-Visit Details    Type of service:  Video Visit   Originating Location (pt. Location): Home    Distant Location (provider location):  On-site  Platform used for Video Visit: Isidro  Signed Electronically by: Radha Cole MD

## 2024-07-10 NOTE — LETTER
July 10, 2024      Yeni NUGENT Gaston  [address]  [place] [address]        To Whom It May Concern:    Yeni Gaston  was seen on 07/10/2024.  Please excuse her  through  07/12/2024 due to illness.        Sincerely,        Radha Cole MD

## 2024-08-08 ENCOUNTER — APPOINTMENT (OUTPATIENT)
Dept: URBAN - METROPOLITAN AREA CLINIC 254 | Age: 39
Setting detail: DERMATOLOGY
End: 2024-08-10

## 2024-08-08 VITALS — WEIGHT: 145 LBS | HEIGHT: 66 IN

## 2024-08-08 DIAGNOSIS — Z80.8 FAMILY HISTORY OF MALIGNANT NEOPLASM OF OTHER ORGANS OR SYSTEMS: ICD-10-CM

## 2024-08-08 DIAGNOSIS — L81.8 OTHER SPECIFIED DISORDERS OF PIGMENTATION: ICD-10-CM

## 2024-08-08 DIAGNOSIS — D485 NEOPLASM OF UNCERTAIN BEHAVIOR OF SKIN: ICD-10-CM

## 2024-08-08 DIAGNOSIS — L70.0 ACNE VULGARIS: ICD-10-CM

## 2024-08-08 DIAGNOSIS — L73.8 OTHER SPECIFIED FOLLICULAR DISORDERS: ICD-10-CM

## 2024-08-08 DIAGNOSIS — D18.0 HEMANGIOMA: ICD-10-CM

## 2024-08-08 DIAGNOSIS — L57.8 OTHER SKIN CHANGES DUE TO CHRONIC EXPOSURE TO NONIONIZING RADIATION: ICD-10-CM

## 2024-08-08 DIAGNOSIS — L81.4 OTHER MELANIN HYPERPIGMENTATION: ICD-10-CM

## 2024-08-08 DIAGNOSIS — Z71.89 OTHER SPECIFIED COUNSELING: ICD-10-CM

## 2024-08-08 DIAGNOSIS — D22 MELANOCYTIC NEVI: ICD-10-CM

## 2024-08-08 PROBLEM — D22.61 MELANOCYTIC NEVI OF RIGHT UPPER LIMB, INCLUDING SHOULDER: Status: ACTIVE | Noted: 2024-08-08

## 2024-08-08 PROBLEM — D22.62 MELANOCYTIC NEVI OF LEFT UPPER LIMB, INCLUDING SHOULDER: Status: ACTIVE | Noted: 2024-08-08

## 2024-08-08 PROBLEM — D22.5 MELANOCYTIC NEVI OF TRUNK: Status: ACTIVE | Noted: 2024-08-08

## 2024-08-08 PROBLEM — D48.5 NEOPLASM OF UNCERTAIN BEHAVIOR OF SKIN: Status: ACTIVE | Noted: 2024-08-08

## 2024-08-08 PROBLEM — D22.4 MELANOCYTIC NEVI OF SCALP AND NECK: Status: ACTIVE | Noted: 2024-08-08

## 2024-08-08 PROBLEM — D18.01 HEMANGIOMA OF SKIN AND SUBCUTANEOUS TISSUE: Status: ACTIVE | Noted: 2024-08-08

## 2024-08-08 PROCEDURE — OTHER BIOPSY BY SHAVE METHOD: OTHER

## 2024-08-08 PROCEDURE — OTHER PRESCRIPTION: OTHER

## 2024-08-08 PROCEDURE — OTHER ADDITIONAL NOTES: OTHER

## 2024-08-08 PROCEDURE — OTHER COUNSELING: OTHER

## 2024-08-08 PROCEDURE — 99214 OFFICE O/P EST MOD 30 MIN: CPT | Mod: 25

## 2024-08-08 PROCEDURE — 11102 TANGNTL BX SKIN SINGLE LES: CPT

## 2024-08-08 PROCEDURE — OTHER MIPS QUALITY: OTHER

## 2024-08-08 PROCEDURE — OTHER PRESCRIPTION MEDICATION MANAGEMENT: OTHER

## 2024-08-08 RX ORDER — SPIRONOLACTONE 50 MG/1
TABLET, FILM COATED ORAL
Qty: 90 | Refills: 0 | Status: ERX | COMMUNITY
Start: 2024-08-08

## 2024-08-08 ASSESSMENT — LOCATION DETAILED DESCRIPTION DERM
LOCATION DETAILED: LEFT LATERAL UPPER BACK
LOCATION DETAILED: RIGHT SUPERIOR UPPER BACK
LOCATION DETAILED: RIGHT SUPERIOR MEDIAL UPPER BACK
LOCATION DETAILED: RIGHT INFERIOR FRONTAL SCALP
LOCATION DETAILED: LEFT FOREHEAD
LOCATION DETAILED: LEFT RIB CAGE
LOCATION DETAILED: RIGHT ANTERIOR DISTAL UPPER ARM
LOCATION DETAILED: LEFT SUPERIOR CENTRAL BUCCAL CHEEK
LOCATION DETAILED: LEFT SUPERIOR UPPER BACK
LOCATION DETAILED: INFERIOR LUMBAR SPINE
LOCATION DETAILED: LEFT ANTERIOR DISTAL THIGH
LOCATION DETAILED: RIGHT SUPERIOR MEDIAL BUCCAL CHEEK
LOCATION DETAILED: LEFT POSTERIOR SHOULDER
LOCATION DETAILED: INFERIOR THORACIC SPINE
LOCATION DETAILED: RIGHT MEDIAL FOREHEAD

## 2024-08-08 ASSESSMENT — LOCATION SIMPLE DESCRIPTION DERM
LOCATION SIMPLE: LEFT UPPER BACK
LOCATION SIMPLE: RIGHT FOREHEAD
LOCATION SIMPLE: LEFT CHEEK
LOCATION SIMPLE: UPPER BACK
LOCATION SIMPLE: LEFT SHOULDER
LOCATION SIMPLE: SCALP
LOCATION SIMPLE: LEFT FOREHEAD
LOCATION SIMPLE: LEFT THIGH
LOCATION SIMPLE: RIGHT CHEEK
LOCATION SIMPLE: RIGHT UPPER ARM
LOCATION SIMPLE: RIGHT UPPER BACK
LOCATION SIMPLE: ABDOMEN
LOCATION SIMPLE: LOWER BACK

## 2024-08-08 ASSESSMENT — LOCATION ZONE DERM
LOCATION ZONE: LEG
LOCATION ZONE: SCALP
LOCATION ZONE: ARM
LOCATION ZONE: TRUNK
LOCATION ZONE: FACE

## 2024-08-08 NOTE — PROCEDURE: BIOPSY BY SHAVE METHOD
Detail Level: Detailed
Depth Of Biopsy: dermis
Was A Bandage Applied: Yes
Size Of Lesion In Cm: 0
Biopsy Type: H and E
Biopsy Method: Dermablade
Anesthesia Type: 1% lidocaine with epinephrine and a 1:10 solution of 8.4% sodium bicarbonate
Anesthesia Volume In Cc: 0.5
Hemostasis: Drysol
Wound Care: Petrolatum
Dressing: bandage
Destruction After The Procedure: No
Type Of Destruction Used: Curettage
Curettage Text: The wound bed was treated with curettage after the biopsy was performed.
Cryotherapy Text: The wound bed was treated with cryotherapy after the biopsy was performed.
Electrodesiccation Text: The wound bed was treated with electrodesiccation after the biopsy was performed.
Electrodesiccation And Curettage Text: The wound bed was treated with electrodesiccation and curettage after the biopsy was performed.
Silver Nitrate Text: The wound bed was treated with silver nitrate after the biopsy was performed.
Lab: [id number]
Path Notes Override (Will Replace All Of The Above Text): Partial biopsy.
Consent: Written consent was obtained and risks were reviewed including but not limited to scarring, infection, bleeding, scabbing, incomplete removal, nerve damage and allergy to anesthesia.
Post-Care Instructions: I reviewed with the patient in detail post-care instructions. Patient is to keep the biopsy site dry overnight, and then apply bacitracin twice daily until healed. Patient may apply hydrogen peroxide soaks to remove any crusting.
Notification Instructions: Patient will be notified of biopsy results. However, patient instructed to call the office if not contacted within 2 weeks.
Billing Type: Third-Party Bill
Information: Selecting Yes will display possible errors in your note based on the variables you have selected. This validation is only offered as a suggestion for you. PLEASE NOTE THAT THE VALIDATION TEXT WILL BE REMOVED WHEN YOU FINALIZE YOUR NOTE. IF YOU WANT TO FAX A PRELIMINARY NOTE YOU WILL NEED TO TOGGLE THIS TO 'NO' IF YOU DO NOT WANT IT IN YOUR FAXED NOTE.

## 2024-08-08 NOTE — PROCEDURE: COUNSELING
Detail Level: Generalized
Detail Level: Detailed
Winlevi Pregnancy And Lactation Text: This medication is considered safe during pregnancy and breastfeeding.
Tazorac Counseling:  Patient advised that medication is irritating and drying.  Patient may need to apply sparingly and wash off after an hour before eventually leaving it on overnight.  The patient verbalized understanding of the proper use and possible adverse effects of tazorac.  All of the patient's questions and concerns were addressed.
Sarecycline Counseling: Patient advised regarding possible photosensitivity and discoloration of the teeth, skin, lips, tongue and gums.  Patient instructed to avoid sunlight, if possible.  When exposed to sunlight, patients should wear protective clothing, sunglasses, and sunscreen.  The patient was instructed to call the office immediately if the following severe adverse effects occur:  hearing changes, easy bruising/bleeding, severe headache, or vision changes.  The patient verbalized understanding of the proper use and possible adverse effects of sarecycline.  All of the patient's questions and concerns were addressed.
Aklief Pregnancy And Lactation Text: It is unknown if this medication is safe to use during pregnancy.  It is unknown if this medication is excreted in breast milk.  Breastfeeding women should use the topical cream on the smallest area of the skin for the shortest time needed while breastfeeding.  Do not apply to nipple and areola.
Dapsone Counseling: I discussed with the patient the risks of dapsone including but not limited to hemolytic anemia, agranulocytosis, rashes, methemoglobinemia, kidney failure, peripheral neuropathy, headaches, GI upset, and liver toxicity.  Patients who start dapsone require monitoring including baseline LFTs and weekly CBCs for the first month, then every month thereafter.  The patient verbalized understanding of the proper use and possible adverse effects of dapsone.  All of the patient's questions and concerns were addressed.
Erythromycin Pregnancy And Lactation Text: This medication is Pregnancy Category B and is considered safe during pregnancy. It is also excreted in breast milk.
Birth Control Pills Counseling: Birth Control Pill Counseling: I discussed with the patient the potential side effects of OCPs including but not limited to increased risk of stroke, heart attack, thrombophlebitis, deep venous thrombosis, hepatic adenomas, breast changes, GI upset, headaches, and depression.  The patient verbalized understanding of the proper use and possible adverse effects of OCPs. All of the patient's questions and concerns were addressed.
Spironolactone Counseling: Patient advised regarding risks of diarrhea, abdominal pain, hyperkalemia, birth defects (for female patients), liver toxicity and renal toxicity. The patient may need blood work to monitor liver and kidney function and potassium levels while on therapy. The patient verbalized understanding of the proper use and possible adverse effects of spironolactone.  All of the patient's questions and concerns were addressed.
Isotretinoin Pregnancy And Lactation Text: This medication is Pregnancy Category X and is considered extremely dangerous during pregnancy. It is unknown if it is excreted in breast milk.
Use Enhanced Medication Counseling?: No
Topical Clindamycin Pregnancy And Lactation Text: This medication is Pregnancy Category B and is considered safe during pregnancy. It is unknown if it is excreted in breast milk.
Doxycycline Pregnancy And Lactation Text: This medication is Pregnancy Category D and not consider safe during pregnancy. It is also excreted in breast milk but is considered safe for shorter treatment courses.
Benzoyl Peroxide Counseling: Patient counseled that medicine may cause skin irritation and bleach clothing.  In the event of skin irritation, the patient was advised to reduce the amount of the drug applied or use it less frequently.   The patient verbalized understanding of the proper use and possible adverse effects of benzoyl peroxide.  All of the patient's questions and concerns were addressed.
Spironolactone Pregnancy And Lactation Text: This medication can cause feminization of the male fetus and should be avoided during pregnancy. The active metabolite is also found in breast milk.
High Dose Vitamin A Counseling: Side effects reviewed, pt to contact office should one occur.
Dapsone Pregnancy And Lactation Text: This medication is Pregnancy Category C and is not considered safe during pregnancy or breast feeding.
Azithromycin Counseling:  I discussed with the patient the risks of azithromycin including but not limited to GI upset, allergic reaction, drug rash, diarrhea, and yeast infections.
Topical Sulfur Applications Pregnancy And Lactation Text: This medication is Pregnancy Category C and has an unknown safety profile during pregnancy. It is unknown if this topical medication is excreted in breast milk.
Topical Retinoid counseling:  Patient advised to apply a pea-sized amount only at bedtime and wait 30 minutes after washing their face before applying.  If too drying, patient may add a non-comedogenic moisturizer. The patient verbalized understanding of the proper use and possible adverse effects of retinoids.  All of the patient's questions and concerns were addressed.
Tetracycline Pregnancy And Lactation Text: This medication is Pregnancy Category D and not consider safe during pregnancy. It is also excreted in breast milk.
Minocycline Counseling: Patient advised regarding possible photosensitivity and discoloration of the teeth, skin, lips, tongue and gums.  Patient instructed to avoid sunlight, if possible.  When exposed to sunlight, patients should wear protective clothing, sunglasses, and sunscreen.  The patient was instructed to call the office immediately if the following severe adverse effects occur:  hearing changes, easy bruising/bleeding, severe headache, or vision changes.  The patient verbalized understanding of the proper use and possible adverse effects of minocycline.  All of the patient's questions and concerns were addressed.
Bactrim Counseling:  I discussed with the patient the risks of sulfa antibiotics including but not limited to GI upset, allergic reaction, drug rash, diarrhea, dizziness, photosensitivity, and yeast infections.  Rarely, more serious reactions can occur including but not limited to aplastic anemia, agranulocytosis, methemoglobinemia, blood dyscrasias, liver or kidney failure, lung infiltrates or desquamative/blistering drug rashes.
Topical Retinoid Pregnancy And Lactation Text: This medication is Pregnancy Category C. It is unknown if this medication is excreted in breast milk.
Winlevi Counseling:  I discussed with the patient the risks of topical clascoterone including but not limited to erythema, scaling, itching, and stinging. Patient voiced their understanding.
Aklief counseling:  Patient advised to apply a pea-sized amount only at bedtime and wait 30 minutes after washing their face before applying.  If too drying, patient may add a non-comedogenic moisturizer.  The most commonly reported side effects including irritation, redness, scaling, dryness, stinging, burning, itching, and increased risk of sunburn.  The patient verbalized understanding of the proper use and possible adverse effects of retinoids.  All of the patient's questions and concerns were addressed.
Tazorac Pregnancy And Lactation Text: This medication is not safe during pregnancy. It is unknown if this medication is excreted in breast milk.
Azelaic Acid Counseling: Patient counseled that medicine may cause skin irritation and to avoid applying near the eyes.  In the event of skin irritation, the patient was advised to reduce the amount of the drug applied or use it less frequently.   The patient verbalized understanding of the proper use and possible adverse effects of azelaic acid.  All of the patient's questions and concerns were addressed.
Isotretinoin Counseling: Patient should get monthly blood tests, not donate blood, not drive at night if vision affected, not share medication, and not undergo elective surgery for 6 months after tx completed. Side effects reviewed, pt to contact office should one occur.
Detail Level: Zone
Birth Control Pills Pregnancy And Lactation Text: This medication should be avoided if pregnant and for the first 30 days post-partum.
Benzoyl Peroxide Pregnancy And Lactation Text: This medication is Pregnancy Category C. It is unknown if benzoyl peroxide is excreted in breast milk.
Tetracycline Counseling: Patient counseled regarding possible photosensitivity and increased risk for sunburn.  Patient instructed to avoid sunlight, if possible.  When exposed to sunlight, patients should wear protective clothing, sunglasses, and sunscreen.  The patient was instructed to call the office immediately if the following severe adverse effects occur:  hearing changes, easy bruising/bleeding, severe headache, or vision changes.  The patient verbalized understanding of the proper use and possible adverse effects of tetracycline.  All of the patient's questions and concerns were addressed. Patient understands to avoid pregnancy while on therapy due to potential birth defects.
Topical Sulfur Applications Counseling: Topical Sulfur Counseling: Patient counseled that this medication may cause skin irritation or allergic reactions.  In the event of skin irritation, the patient was advised to reduce the amount of the drug applied or use it less frequently.   The patient verbalized understanding of the proper use and possible adverse effects of topical sulfur application.  All of the patient's questions and concerns were addressed.
High Dose Vitamin A Pregnancy And Lactation Text: High dose vitamin A therapy is contraindicated during pregnancy and breast feeding.
Erythromycin Counseling:  I discussed with the patient the risks of erythromycin including but not limited to GI upset, allergic reaction, drug rash, diarrhea, increase in liver enzymes, and yeast infections.
Bactrim Pregnancy And Lactation Text: This medication is Pregnancy Category D and is known to cause fetal risk.  It is also excreted in breast milk.
Topical Clindamycin Counseling: Patient counseled that this medication may cause skin irritation or allergic reactions.  In the event of skin irritation, the patient was advised to reduce the amount of the drug applied or use it less frequently.   The patient verbalized understanding of the proper use and possible adverse effects of clindamycin.  All of the patient's questions and concerns were addressed.
Azelaic Acid Pregnancy And Lactation Text: This medication is considered safe during pregnancy and breast feeding.
Azithromycin Pregnancy And Lactation Text: This medication is considered safe during pregnancy and is also secreted in breast milk.
Doxycycline Counseling:  Patient counseled regarding possible photosensitivity and increased risk for sunburn.  Patient instructed to avoid sunlight, if possible.  When exposed to sunlight, patients should wear protective clothing, sunglasses, and sunscreen.  The patient was instructed to call the office immediately if the following severe adverse effects occur:  hearing changes, easy bruising/bleeding, severe headache, or vision changes.  The patient verbalized understanding of the proper use and possible adverse effects of doxycycline.  All of the patient's questions and concerns were addressed.

## 2024-08-08 NOTE — PROCEDURE: PRESCRIPTION MEDICATION MANAGEMENT
Render In Strict Bullet Format?: No
Plan: Patient to RTC in 3 months for recheck.
Detail Level: Zone
Initiate Treatment: Take 1 spironolactone 50 mg tablet once daily for acne.

## 2024-11-07 ENCOUNTER — TELEPHONE (OUTPATIENT)
Dept: GASTROENTEROLOGY | Facility: CLINIC | Age: 39
End: 2024-11-07
Payer: COMMERCIAL

## 2024-11-07 NOTE — TELEPHONE ENCOUNTER
Called to remind patient of their upcoming appointment with our GI clinic, on 11/14 with Silvia Cobb PA-C. This appointment is scheduled as an in-person appt. Please arrive 15 minutes early to check in for your appointment. , if your appointment is virtual (video or telephone) you need to be in [place] for the visit. To reschedule or cancel appointment patient needs to call [phone] option 1.  To confirm appointment patient advised to call back.     Joelle Wylie

## 2024-11-14 ENCOUNTER — ANCILLARY PROCEDURE (OUTPATIENT)
Dept: GENERAL RADIOLOGY | Facility: CLINIC | Age: 39
End: 2024-11-14
Attending: PHYSICIAN ASSISTANT
Payer: COMMERCIAL

## 2024-11-14 ENCOUNTER — OFFICE VISIT (OUTPATIENT)
Dept: GASTROENTEROLOGY | Facility: CLINIC | Age: 39
End: 2024-11-14
Attending: FAMILY MEDICINE
Payer: COMMERCIAL

## 2024-11-14 VITALS
HEART RATE: 73 BPM | OXYGEN SATURATION: 97 % | DIASTOLIC BLOOD PRESSURE: 71 MMHG | WEIGHT: 145 LBS | SYSTOLIC BLOOD PRESSURE: 109 MMHG | BODY MASS INDEX: 23.73 KG/M2

## 2024-11-14 DIAGNOSIS — R19.5 LOOSE STOOLS: ICD-10-CM

## 2024-11-14 DIAGNOSIS — K58.2 IRRITABLE BOWEL SYNDROME WITH BOTH CONSTIPATION AND DIARRHEA: ICD-10-CM

## 2024-11-14 DIAGNOSIS — R19.5 LOOSE STOOLS: Primary | ICD-10-CM

## 2024-11-14 LAB
ALBUMIN SERPL BCG-MCNC: 4.8 G/DL (ref 3.5–5.2)
ALP SERPL-CCNC: 41 U/L (ref 40–150)
ALT SERPL W P-5'-P-CCNC: 9 U/L (ref 0–50)
ANION GAP SERPL CALCULATED.3IONS-SCNC: 15 MMOL/L (ref 7–15)
AST SERPL W P-5'-P-CCNC: 18 U/L (ref 0–45)
BILIRUB SERPL-MCNC: 0.5 MG/DL
BUN SERPL-MCNC: 9.1 MG/DL (ref 6–20)
CALCIUM SERPL-MCNC: 9.5 MG/DL (ref 8.8–10.4)
CHLORIDE SERPL-SCNC: 102 MMOL/L (ref 98–107)
CREAT SERPL-MCNC: 0.74 MG/DL (ref 0.51–0.95)
CRP SERPL-MCNC: <3 MG/L
EGFRCR SERPLBLD CKD-EPI 2021: >90 ML/MIN/1.73M2
ERYTHROCYTE [DISTWIDTH] IN BLOOD BY AUTOMATED COUNT: 11.9 % (ref 10–15)
GLUCOSE SERPL-MCNC: 91 MG/DL (ref 70–99)
HCO3 SERPL-SCNC: 23 MMOL/L (ref 22–29)
HCT VFR BLD AUTO: 41.6 % (ref 35–47)
HGB BLD-MCNC: 14.2 G/DL (ref 11.7–15.7)
MCH RBC QN AUTO: 30.2 PG (ref 26.5–33)
MCHC RBC AUTO-ENTMCNC: 34.1 G/DL (ref 31.5–36.5)
MCV RBC AUTO: 89 FL (ref 78–100)
PLATELET # BLD AUTO: 184 10E3/UL (ref 150–450)
POTASSIUM SERPL-SCNC: 3.6 MMOL/L (ref 3.4–5.3)
PROT SERPL-MCNC: 7.5 G/DL (ref 6.4–8.3)
RBC # BLD AUTO: 4.7 10E6/UL (ref 3.8–5.2)
SODIUM SERPL-SCNC: 140 MMOL/L (ref 135–145)
TSH SERPL DL<=0.005 MIU/L-ACNC: 0.99 UIU/ML (ref 0.3–4.2)
WBC # BLD AUTO: 7.7 10E3/UL (ref 4–11)

## 2024-11-14 PROCEDURE — 74019 RADEX ABDOMEN 2 VIEWS: CPT | Mod: GC | Performed by: RADIOLOGY

## 2024-11-14 ASSESSMENT — PAIN SCALES - GENERAL: PAINLEVEL_OUTOF10: NO PAIN (0)

## 2024-11-14 NOTE — NURSING NOTE
Chief Complaint   Patient presents with    New Patient     She requests these members of her care team be copied on today's visit information:  PCP: Annie Guerrier    Referring Provider:  Annie Guerrier MD  [id number] [place] [address]    Vitals:    11/14/24 1152   BP: 109/71   Pulse: 73   SpO2: 97%   Weight: 65.8 kg (145 lb)     Body mass index is 23.73 kg/m .    Medications were reconciled.    Does patient need any medication refills at today's visit? Radha Wylie

## 2024-11-14 NOTE — PROGRESS NOTES
GI CLINIC VISIT    CC/REFERRING MD:  Annie Guerrier  REASON FOR CONSULTATION: variable stool pattern    ASSESSMENT/PLAN:  39 y/o F presents for evaluation of GI complaints.    #intermittent loose stools  #?IBS  Patient reports that for the last few years she has had issues with constipation and loose stools - will have a BM 3x/week, described as a bristol type 5, with associated pushing, at times can have liquid stools as well. She does have mild abdominal pain as well. Differential ddx includes but is not limited to infectious, inflammatory, malabsorptive d/o such as celiac disease, overflow diarrhea in the setting of constipation, IBS, etc. We did have an at length discussion regarding disorders of the gut brain axis, and discussed criteria for diagnosis of IBS which she does appear to be meeting. Will plan to obtain labs and stool studies as well as an abdominal x-ray to evaluate stool burden. Discussed the use of a soluble fiber supplementation. If above work up is unrevealing, would consider referral to nutrition and GI health psychology. She has no alarm symptoms at this time which would warrant a colonscopy.   --obtain labs and stool studies.   --obtain AXR.   --start a soluble fiber supplementation.  --future consideration: GI psych and nutrition referral     Colorectal cancer screenin    RTC 3 months    Thank you for this consultation.  It was a pleasure to participate in the care of this patient; please contact us with any further questions.       30 minutes spent on the date of the encounter doing chart review, review of outside records, patient visit, and documentation    This note was created with voice recognition software, and while reviewed for accuracy, typos may remain.    Silvia Cobb PA-C  Division of Gastroenterology, Hepatology and Nutrition  [facility]  39 y/o F presents for evaluation of GI complaints.    Patient reports that  "for the last couple of years she has struggled to have a \"normal bowel movement\".   Prior to two years ago patient would have a bowel movement daily, described as a bristol type 3, denies pushing/straining. For the last couple of years though, she will generally have a BM, three times a week, described as a bristol type 5, denies BRBPR. She reports pushing at times to have a bowel movement. Every other week she will experience liquid stools - and will have 2-3 liquid stools that day. She describes a sensation of incomplete evacuation. Denies abdominal pain, nausea, or vomiting. She reports abdominal bloating, notes that it is constant. Seems to get worse after eating. She does take magnesium oxide as needed if she feels constipated. At times will also try senna leaf tea.   Patient does not consume dairy, and has been doing this for the last 6 months - when she consumes dairy will have some abdominal pain.  Patient stopped caffeine two weeks ago, and feels this is helpful. She has also stopped all carbonated drinks in the last few weeks as well.   Denies heartburn or other reflux symptoms.  Patient was concerned she may have had hookworm as she had a recurrent rash - she was able to get ivermectin, rash and overall body itching has improved.       Denies NSAIDs or Tylenol. Denies use of OTC herbal supplements/weight loss products.      Denies use of ETOH and tobacco products. Smokes marijuana once a week.     No family history of GI related malignancy (esophageal, gastric, pancreatic, liver or colon) or family history of IBD/celiac disease.     ROS:    No fevers or chills  No weight loss  No shortness of breath or wheezing  No chest pain or pressure  No odynophagia or dysphagia  No BRBPR, hematochezia, melena  +anxiety and depression --  L-thianine. Does not follow with a psychiatrist or therapist.    PROBLEM LIST  Patient Active Problem List    Diagnosis Date Noted    Episodic tension-type headache, not intractable " 06/20/2024     Priority: Medium    Chronic idiopathic urticaria 06/20/2024     Priority: Medium    Irritable bowel syndrome with both constipation and diarrhea 06/20/2024     Priority: Medium    Other disorders of lactation 09/23/2020     Priority: Medium    History of depression 09/16/2020     Priority: Medium     On no meds currently      Labor and delivery indication for care or intervention 09/09/2020     Priority: Medium    Indication for care in labor or delivery 09/08/2020     Priority: Medium    Anxiety 08/13/2018     Priority: Medium       PERTINENT PAST MEDICAL HISTORY:    Past Medical History:   Diagnosis Date    Abnormal Pap smear and cervical HPV (human papillomavirus) 2011    didn't do colp    Allergic rhinitis due to other allergen     Depressive disorder     not treated now, feels well    WILBER (generalised anxiety disorder) 9/24/2012    Other closed fractures of distal end of radius (alone)     Uncomplicated asthma     exercise induced    Varicella without mention of complication     Before the age of 1       PREVIOUS SURGERIES:    Past Surgical History:   Procedure Laterality Date    HC CLOSED TX RAD/ULNAR SHAFT FX W/ MANIP  3/30/90    (R) closed reduction & long arm cast application    WISDOM ST GUIDEWIRE         PREVIOUS ENDOSCOPY:  None.    ALLERGIES:     Allergies   Allergen Reactions    Adhesive Tape Rash     States she gets burns from adhesive tape and bandages    Latex Rash    Bactrim [Sulfamethoxazole-Trimethoprim] Itching and Swelling     Skin reaction on hands    Seasonal Allergies        PERTINENT MEDICATIONS:    Current Outpatient Medications:     Magnesium Oxide 140 MG CAPS, , Disp: , Rfl:     triamcinolone (NASACORT) 55 MCG/ACT nasal aerosol, Spray 2 sprays into both nostrils daily., Disp: , Rfl:     COLOSTRUM PO, Take by mouth daily (Patient not taking: Reported on 7/10/2024), Disp: , Rfl:     dicyclomine (BENTYL) 10 MG capsule, Take 1 capsule (10 mg) by mouth 4 times daily as needed  (IBS cramping/ pain) (Patient not taking: Reported on 2024), Disp: 30 capsule, Rfl: 3    SUMAtriptan (IMITREX) 50 MG tablet, Take 1 tablet (50 mg) by mouth at onset of headache for migraine (Patient not taking: Reported on 7/10/2024), Disp: 30 tablet, Rfl: 3    SOCIAL HISTORY:    Social History     Socioeconomic History    Marital status:      Spouse name: Not on file    Number of children: 0    Years of education: Not on file    Highest education level: Not on file   Occupational History     Employer: UNEMPLOYED   Tobacco Use    Smoking status: Former     Current packs/day: 0.00     Types: Cigarettes     Quit date: 2011     Years since quittin.5    Smokeless tobacco: Never    Tobacco comments:     marijuana   Vaping Use    Vaping status: Never Used   Substance and Sexual Activity    Alcohol use: Not Currently    Drug use: No    Sexual activity: Yes     Partners: Male     Comment: IUD    Other Topics Concern    Parent/sibling w/ CABG, MI or angioplasty before 65F 55M? 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 Mutually monogamous relationship .[employer] lifting weight.Moved back home from [place], [place]             Social Drivers of Health     Financial Resource Strain: Low Risk  (2024)    Financial Resource Strain     Within the past 12 months, have you or your family members you live with been unable to get utilities (heat, electricity) when it was really needed?: No   Food Insecurity: Low Risk  (2024)    Food Insecurity     Within the past 12 months, did you worry that your food would run out before you got money to buy more?: No     Within the past 12 months, did the food you bought just not last and you didn t have money to get more?: No   Transportation Needs: Low Risk  (2024)    Transportation Needs     Within the past 12 months, has lack of transportation kept you from medical appointments, getting your medicines, non-medical  meetings or appointments, work, or from getting things that you need?: No   Physical Activity: Unknown (6/20/2024)    Exercise Vital Sign     Days of Exercise per Week: 6 days     Minutes of Exercise per Session: Not on file   Stress: No Stress Concern Present (6/20/2024)    [employer] - Occupational Stress Questionnaire     Feeling of Stress : Only a little   Social Connections: Unknown (6/20/2024)    Social Connection and Isolation Panel [NHANES]     Frequency of Communication with Friends and Family: Not on file     Frequency of Social Gatherings with Friends and Family: Once a week     Attends Confucianism Services: Not on file     Active Member of Clubs or Organizations: Not on file     Attends Club or Organization Meetings: Not on file     Marital Status: Not on file   Interpersonal Safety: Low Risk  (6/20/2024)    Interpersonal Safety     Do you feel physically and emotionally safe where you currently live?: Yes     Within the past 12 months, have you been hit, slapped, kicked or otherwise physically hurt by someone?: No     Within the past 12 months, have you been humiliated or emotionally abused in other ways by your partner or ex-partner?: No   Housing Stability: Low Risk  (6/20/2024)    Housing Stability     Do you have housing? : Yes     Are you worried about losing your housing?: No       FAMILY HISTORY:  FH of CRC: none  FH of IBD: none  Family History   Problem Relation Age of Onset    Depression Mother     Allergies Mother     Circulatory Mother     Mental Illness Mother     Respiratory Father         Asthma    Depression Father     Allergies Father     Pacemaker Father     Cerebrovascular Disease Father     Mental Illness Father     Mental Illness Sister     Heart Disease Maternal Grandmother     Alcohol/Drug Maternal Grandmother     Mental Illness Maternal Grandmother     Genitourinary Problems Maternal Grandfather         Kidney stones    Arthritis Maternal Grandfather      Cerebrovascular Disease Maternal Grandfather     Mental Illness Maternal Grandfather     Alcohol/Drug Paternal Grandmother     Aneurysm Paternal Grandmother     Mental Illness Paternal Grandmother     Mental Illness Paternal Grandfather     Allergies Sister     Mental Illness Sister     Aneurysm Paternal Uncle        Past/family/social history reviewed and no changes    PHYSICAL EXAMINATION:  Constitutional: aaox3, cooperative, pleasant, not dyspneic/diaphoretic, no acute distress  Vitals reviewed: /71   Pulse 73   Wt 65.8 kg (145 lb)   SpO2 97%   BMI 23.73 kg/m    Wt:   Wt Readings from Last 2 Encounters:   11/14/24 65.8 kg (145 lb)   06/20/24 67.4 kg (148 lb 9.6 oz)      Eyes: Sclera anicteric/injected  Respiratory: Unlabored breathing  Abd: soft Nondistended, mild periumbilical tenderness, no rebound/guarding  Skin: warm, perfused, no jaundice  Psych: Normal affect  MSK: Normal gait      PERTINENT STUDIES:    Office Visit on 06/20/2024   Component Date Value Ref Range Status    Human Papilloma Virus 16 DNA 06/20/2024 Negative  Negative Final    Human Papilloma Virus 18 DNA 06/20/2024 Negative  Negative Final    Human Papilloma Virus Other 06/20/2024 Negative  Negative Final    FINAL DIAGNOSIS 06/20/2024    Final                    Value:This patient's sample is negative for high risk HPV DNA.                                                                                                                                  METHODOLOGY: The [employer] system uses automated extraction, simultaneous                           amplification of HPV (E6/E7 oncogenes) and beta-globin, followed by real                           time detection of fluorescent labeled HPV and beta globin using specific                           oligonucleotide probes. The test specifically identifies types HPV 16 DNA                           and HPV 18 DNA while concurrently detecting the rest of the high risk                            types (31, 33, 35, 39, 45, 51, 52, 56, 58, 59, 66 or 68).                                                     COMMENTS: This test is not intended for use as a screening device for                           woman under age 30 with normal cervical cytology. Results should be                           correlated with cytologic and histologic findings. Close clinical follow                           up is recommended.                              Chlamydia Trachomatis 06/20/2024 Negative  Negative Final    Neisseria gonorrhoeae 06/20/2024 Negative  Negative Final    Hepatitis C Antibody 06/20/2024 Nonreactive  Nonreactive Final    HIV Antigen Antibody Combo 06/20/2024 Nonreactive  Nonreactive Final    Treponema Antibody Total 06/20/2024 Nonreactive  Nonreactive Final    Interpretation 06/20/2024 Negative for Intraepithelial Lesion or Malignancy (NILM)    Final    Comment 06/20/2024    Final                    Value:                          Papanicolaou Test Limitations:  Cervical cytology is a screening test with                           limited sensitivity, and regular screening is critical for cancer                           prevention.  Pap tests are primarily effective for the                           diagnosis/prevention of squamous cell carcinoma, not adenocarcinoma or                           other cancers.                              Specimen Adequacy 06/20/2024 Satisfactory for evaluation, endocervical/transformation zone component present   Final    Clinical Information 06/20/2024    Final                    Value:none    LMP/Menopause Date 06/20/2024    Final                    Value:                          Comment: IUD in place    Previous Abnormal? 06/20/2024    Final                    Value:No    Performing Labs 06/20/2024    Final                    Value:The technical component of this testing was completed at [facility]  [facility].                                                    Stain controls for all stains resulted within this report have been                           reviewed and show appropriate reactivity.

## 2024-11-14 NOTE — LETTER
2024      Yeni Gaston  [address]  [place] [address]      Dear Colleague,    Thank you for referring your patient, Yeni Gaston, to the [facility]. Please see a copy of my visit note below.    GI CLINIC VISIT    CC/REFERRING MD:  Annie Guerrier  REASON FOR CONSULTATION: variable stool pattern    ASSESSMENT/PLAN:  39 y/o F presents for evaluation of GI complaints.    #intermittent loose stools  #?IBS  Patient reports that for the last few years she has had issues with constipation and loose stools - will have a BM 3x/week, described as a bristol type 5, with associated pushing, at times can have liquid stools as well. She does have mild abdominal pain as well. Differential ddx includes but is not limited to infectious, inflammatory, malabsorptive d/o such as celiac disease, overflow diarrhea in the setting of constipation, IBS, etc. We did have an at length discussion regarding disorders of the gut brain axis, and discussed criteria for diagnosis of IBS which she does appear to be meeting. Will plan to obtain labs and stool studies as well as an abdominal x-ray to evaluate stool burden. Discussed the use of a soluble fiber supplementation. If above work up is unrevealing, would consider referral to nutrition and GI health psychology. She has no alarm symptoms at this time which would warrant a colonscopy.   --obtain labs and stool studies.   --obtain AXR.   --start a soluble fiber supplementation.  --future consideration: GI psych and nutrition referral     Colorectal cancer screenin    RTC 3 months    Thank you for this consultation.  It was a pleasure to participate in the care of this patient; please contact us with any further questions.       30 minutes spent on the date of the encounter doing chart review, review of outside records, patient visit, and documentation    This note was created with voice recognition software, and while reviewed for accuracy, typos  "may remain.    Silvia Cobb PA-C  Division of Gastroenterology, Hepatology and Nutrition  [facility]      HPI  37 y/o F presents for evaluation of GI complaints.    Patient reports that for the last couple of years she has struggled to have a \"normal bowel movement\".   Prior to two years ago patient would have a bowel movement daily, described as a bristol type 3, denies pushing/straining. For the last couple of years though, she will generally have a BM, three times a week, described as a bristol type 5, denies BRBPR. She reports pushing at times to have a bowel movement. Every other week she will experience liquid stools - and will have 2-3 liquid stools that day. She describes a sensation of incomplete evacuation. Denies abdominal pain, nausea, or vomiting. She reports abdominal bloating, notes that it is constant. Seems to get worse after eating. She does take magnesium oxide as needed if she feels constipated. At times will also try senna leaf tea.   Patient does not consume dairy, and has been doing this for the last 6 months - when she consumes dairy will have some abdominal pain.  Patient stopped caffeine two weeks ago, and feels this is helpful. She has also stopped all carbonated drinks in the last few weeks as well.   Denies heartburn or other reflux symptoms.  Patient was concerned she may have had hookworm as she had a recurrent rash - she was able to get ivermectin, rash and overall body itching has improved.       Denies NSAIDs or Tylenol. Denies use of OTC herbal supplements/weight loss products.      Denies use of ETOH and tobacco products. Smokes marijuana once a week.     No family history of GI related malignancy (esophageal, gastric, pancreatic, liver or colon) or family history of IBD/celiac disease.     ROS:    No fevers or chills  No weight loss  No shortness of breath or wheezing  No chest pain or pressure  No odynophagia or dysphagia  No BRBPR, " hematochezia, melena  +anxiety and depression --  L-thianine. Does not follow with a psychiatrist or therapist.    PROBLEM LIST  Patient Active Problem List    Diagnosis Date Noted     Episodic tension-type headache, not intractable 06/20/2024     Priority: Medium     Chronic idiopathic urticaria 06/20/2024     Priority: Medium     Irritable bowel syndrome with both constipation and diarrhea 06/20/2024     Priority: Medium     Other disorders of lactation 09/23/2020     Priority: Medium     History of depression 09/16/2020     Priority: Medium     On no meds currently       Labor and delivery indication for care or intervention 09/09/2020     Priority: Medium     Indication for care in labor or delivery 09/08/2020     Priority: Medium     Anxiety 08/13/2018     Priority: Medium       PERTINENT PAST MEDICAL HISTORY:    Past Medical History:   Diagnosis Date     Abnormal Pap smear and cervical HPV (human papillomavirus) 2011    didn't do colp     Allergic rhinitis due to other allergen      Depressive disorder     not treated now, feels well     IWLBER (generalised anxiety disorder) 9/24/2012     Other closed fractures of distal end of radius (alone)      Uncomplicated asthma     exercise induced     Varicella without mention of complication     Before the age of 1       PREVIOUS SURGERIES:    Past Surgical History:   Procedure Laterality Date     HC CLOSED TX RAD/ULNAR SHAFT FX W/ MANIP  3/30/90    (R) closed reduction & long arm cast application     WISDOM ST GUIDEWIRE         PREVIOUS ENDOSCOPY:  None.    ALLERGIES:     Allergies   Allergen Reactions     Adhesive Tape Rash     States she gets burns from adhesive tape and bandages     Latex Rash     Bactrim [Sulfamethoxazole-Trimethoprim] Itching and Swelling     Skin reaction on hands     Seasonal Allergies        PERTINENT MEDICATIONS:    Current Outpatient Medications:      Magnesium Oxide 140 MG CAPS, , Disp: , Rfl:      triamcinolone (NASACORT) 55 MCG/ACT nasal  aerosol, Spray 2 sprays into both nostrils daily., Disp: , Rfl:      COLOSTRUM PO, Take by mouth daily (Patient not taking: Reported on 7/10/2024), Disp: , Rfl:      dicyclomine (BENTYL) 10 MG capsule, Take 1 capsule (10 mg) by mouth 4 times daily as needed (IBS cramping/ pain) (Patient not taking: Reported on 2024), Disp: 30 capsule, Rfl: 3     SUMAtriptan (IMITREX) 50 MG tablet, Take 1 tablet (50 mg) by mouth at onset of headache for migraine (Patient not taking: Reported on 7/10/2024), Disp: 30 tablet, Rfl: 3    SOCIAL HISTORY:    Social History     Socioeconomic History     Marital status:      Spouse name: Not on file     Number of children: 0     Years of education: Not on file     Highest education level: Not on file   Occupational History     Employer: UNEMPLOYED   Tobacco Use     Smoking status: Former     Current packs/day: 0.00     Types: Cigarettes     Quit date: 2011     Years since quittin.5     Smokeless tobacco: Never     Tobacco comments:     marijuana   Vaping Use     Vaping status: Never Used   Substance and Sexual Activity     Alcohol use: Not Currently     Drug use: No     Sexual activity: Yes     Partners: Male     Comment: IUD    Other Topics Concern     Parent/sibling w/ CABG, MI or angioplasty before 65F 55M? 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 Mutually monogamous relationship .lilkes lifting weight.Moved back home from [place], [place]             Social Drivers of Health     Financial Resource Strain: Low Risk  (2024)    Financial Resource Strain      Within the past 12 months, have you or your family members you live with been unable to get utilities (heat, electricity) when it was really needed?: No   Food Insecurity: Low Risk  (2024)    Food Insecurity      Within the past 12 months, did you worry that your food would run out before you got money to buy more?: No      Within the past 12 months, did the  food you bought just not last and you didn t have money to get more?: No   Transportation Needs: Low Risk  (6/20/2024)    Transportation Needs      Within the past 12 months, has lack of transportation kept you from medical appointments, getting your medicines, non-medical meetings or appointments, work, or from getting things that you need?: No   Physical Activity: Unknown (6/20/2024)    Exercise Vital Sign      Days of Exercise per Week: 6 days      Minutes of Exercise per Session: Not on file   Stress: No Stress Concern Present (6/20/2024)    [place] [employer] - Occupational Stress Questionnaire      Feeling of Stress : Only a little   Social Connections: Unknown (6/20/2024)    Social Connection and Isolation Panel [NHANES]      Frequency of Communication with Friends and Family: Not on file      Frequency of Social Gatherings with Friends and Family: Once a week      Attends Jewish Services: Not on file      Active Member of Clubs or Organizations: Not on file      Attends Club or Organization Meetings: Not on file      Marital Status: Not on file   Interpersonal Safety: Low Risk  (6/20/2024)    Interpersonal Safety      Do you feel physically and emotionally safe where you currently live?: Yes      Within the past 12 months, have you been hit, slapped, kicked or otherwise physically hurt by someone?: No      Within the past 12 months, have you been humiliated or emotionally abused in other ways by your partner or ex-partner?: No   Housing Stability: Low Risk  (6/20/2024)    Housing Stability      Do you have housing? : Yes      Are you worried about losing your housing?: No       FAMILY HISTORY:  FH of CRC: none  FH of IBD: none  Family History   Problem Relation Age of Onset     Depression Mother      Allergies Mother      Circulatory Mother      Mental Illness Mother      Respiratory Father         Asthma     Depression Father      Allergies Father      Pacemaker Father       Cerebrovascular Disease Father      Mental Illness Father      Mental Illness Sister      Heart Disease Maternal Grandmother      Alcohol/Drug Maternal Grandmother      Mental Illness Maternal Grandmother      Genitourinary Problems Maternal Grandfather         Kidney stones     Arthritis Maternal Grandfather      Cerebrovascular Disease Maternal Grandfather      Mental Illness Maternal Grandfather      Alcohol/Drug Paternal Grandmother      Aneurysm Paternal Grandmother      Mental Illness Paternal Grandmother      Mental Illness Paternal Grandfather      Allergies Sister      Mental Illness Sister      Aneurysm Paternal Uncle        Past/family/social history reviewed and no changes    PHYSICAL EXAMINATION:  Constitutional: aaox3, cooperative, pleasant, not dyspneic/diaphoretic, no acute distress  Vitals reviewed: /71   Pulse 73   Wt 65.8 kg (145 lb)   SpO2 97%   BMI 23.73 kg/m    Wt:   Wt Readings from Last 2 Encounters:   11/14/24 65.8 kg (145 lb)   06/20/24 67.4 kg (148 lb 9.6 oz)      Eyes: Sclera anicteric/injected  Respiratory: Unlabored breathing  Abd: soft Nondistended, mild periumbilical tenderness, no rebound/guarding  Skin: warm, perfused, no jaundice  Psych: Normal affect  MSK: Normal gait      PERTINENT STUDIES:    Office Visit on 06/20/2024   Component Date Value Ref Range Status     Human Papilloma Virus 16 DNA 06/20/2024 Negative  Negative Final     Human Papilloma Virus 18 DNA 06/20/2024 Negative  Negative Final     Human Papilloma Virus Other 06/20/2024 Negative  Negative Final     FINAL DIAGNOSIS 06/20/2024    Final                    Value:This patient's sample is negative for high risk HPV DNA.                                                                                                                                  METHODOLOGY: The [employer] system uses automated extraction, simultaneous                           amplification of HPV (E6/E7 oncogenes) and beta-globin, followed by  real                           time detection of fluorescent labeled HPV and beta globin using specific                           oligonucleotide probes. The test specifically identifies types HPV 16 DNA                           and HPV 18 DNA while concurrently detecting the rest of the high risk                           types (31, 33, 35, 39, 45, 51, 52, 56, 58, 59, 66 or 68).                                                     COMMENTS: This test is not intended for use as a screening device for                           woman under age 30 with normal cervical cytology. Results should be                           correlated with cytologic and histologic findings. Close clinical follow                           up is recommended.                               Chlamydia Trachomatis 06/20/2024 Negative  Negative Final     Neisseria gonorrhoeae 06/20/2024 Negative  Negative Final     Hepatitis C Antibody 06/20/2024 Nonreactive  Nonreactive Final     HIV Antigen Antibody Combo 06/20/2024 Nonreactive  Nonreactive Final     Treponema Antibody Total 06/20/2024 Nonreactive  Nonreactive Final     Interpretation 06/20/2024 Negative for Intraepithelial Lesion or Malignancy (NILM)    Final     Comment 06/20/2024    Final                    Value:                          Papanicolaou Test Limitations:  Cervical cytology is a screening test with                           limited sensitivity, and regular screening is critical for cancer                           prevention.  Pap tests are primarily effective for the                           diagnosis/prevention of squamous cell carcinoma, not adenocarcinoma or                           other cancers.                               Specimen Adequacy 06/20/2024 Satisfactory for evaluation, endocervical/transformation zone component present   Final     Clinical Information 06/20/2024    Final                    Value:none     LMP/Menopause Date 06/20/2024    Final                     Value:                          Comment: IUD in place     Previous Abnormal? 06/20/2024    Final                    Value:No     Performing Labs 06/20/2024    Final                    Value:The technical component of this testing was completed at [facility].                                                    Stain controls for all stains resulted within this report have been                           reviewed and show appropriate reactivity.   Again, thank you for allowing me to participate in the care of your patient.        Sincerely,        Silvia Cobb PA-C

## 2024-11-14 NOTE — PATIENT INSTRUCTIONS
It was a pleasure meeting with you today and discussing your healthcare plan. Below is a summary of what we covered:    --please obtain labs and stool studies.   --please obtain an abdominal x-ray to evaluate stool burden.   ---- Recommend starting supplementation with a powdered soluble fiber. When used on a daily basis, this can help regulate the consistency of your stools. Metamucil (psyllium) and Citrucel are preferred examples. You can start with 1-2 teaspoons per day, with goal to gradually increase to 1 tablespoon daily. You can increase up to 1 tablespoon three times daily if needed. It is important to stay well-hydrated with use of fiber supplementation and to make sure that the fiber powder is well mixed with water as directed on the label. You may experience some bloating with initiation of fiber, which will improve over the first few weeks. A good trial to evaluate the effect is 3-6 months. Of note, many of the fiber products contain artificial sweeteners, which can cause bloating, gas, and diarrhea in those who may be sensitive to artificial sweeteners. If this is the case, recommend trying Metamucil Premium Blend (with Stevia), Metamucil 4-in-1 without Added Sweeteners, or Bellway (sweetened with Monk fruit extract).          Please see below for any additional questions and scheduling guidelines.    Sign up for [employer]: [employer] patient portal serves as a secure platform for accessing your medical records from the [facility]. Additionally, [employer] facilitates easy, timely, and secure messaging with your care team. If you have not signed up, you may do so by using the provided code or calling [phone].    Coordinating your care after your visit:  There are multiple options for scheduling your follow-up care based on your provider's recommendation.    How do I schedule a follow-up clinic appointment:   After your appointment, you may receive scheduling assistance with the Clinic  Coordinators by having a seat in the waiting room and a Clinic Coordinator will call you up to schedule.  Virtual visits or after you leave the clinic:  Your provider has placed a follow-up order in the [facility] portal for scheduling your return appointment. A member of the scheduling team will contact you to schedule.  [facility] Scheduling: Timely scheduling through [facility] is advised to ensure appointment availability.   Call to schedule: You may schedule your follow-up appointment(s) by calling [phone], option 1.    How do I schedule my endoscopy or colonoscopy procedure:  If a procedure, such as a colonoscopy or upper endoscopy was ordered by your provider, the scheduling team will contact you to schedule this procedure. Or you may choose to call to schedule at   [phone], option 2.  Please allow 20-30 minutes when scheduling a procedure.    How do I get my blood work done? To get your blood work done, you need to schedule a lab appointment at an [facility]. There are multiple ways to schedule:   At the clinic: The Clinic Coordinator you meet after your visit can help you schedule a lab appointment.   [facility] scheduling: [facility] offers online lab scheduling at all [facility] laboratory locations.   Call to schedule: You can call [phone] to schedule your lab appointment.    How do I schedule my imaging study: To schedule imaging studies, such as CT scans, ultrasounds, MRIs, or X-rays, contact Imaging Services at [phone].    How do I schedule a referral to another doctor: If your provider recommended a referral to another specialist(s), the referral order was placed by your provider. You will receive a phone call to schedule this referral, or you may choose to call the number attached to the referral to self-schedule.    For Post-Visit Question(s):  For any inquiries following today's visit:  Please utilize [facility] messaging and allow 48 hours for reply or contact the Call  Center during normal business hours at [phone], option 3.  For Emergent After-hours questions, contact the On-Call GI Fellow through the [facility]  at [phone].  In addition, you may contact your Nurse directly using the provided contact information.    Test Results:  Test results will be accessible via [employer] in compliance with the 21st Century Cures Act. This means that your results will be available to you at the same time as your provider. Often you may see your results before your provider does. Results are reviewed by staff within two weeks with communication follow-up. Results may be released in the patient portal prior to your care team review.    Prescription Refill(s):  Medication prescribed by your provider will be addressed during your visit. For future refills, please coordinate with your pharmacy. If you have not had a recent clinic visit or routine labs, for your safety, your provider may not be able to refill your prescription.

## 2024-11-15 LAB
IGA SERPL-MCNC: 173 MG/DL (ref 84–499)
TTG IGA SER-ACNC: 0.4 U/ML
TTG IGG SER-ACNC: <0.6 U/ML

## 2025-02-06 ENCOUNTER — APPOINTMENT (OUTPATIENT)
Dept: URBAN - METROPOLITAN AREA CLINIC 254 | Age: 40
Setting detail: DERMATOLOGY
End: 2025-02-06

## 2025-02-06 VITALS — HEIGHT: 66 IN | WEIGHT: 145 LBS

## 2025-02-06 DIAGNOSIS — B00.1 HERPESVIRAL VESICULAR DERMATITIS: ICD-10-CM

## 2025-02-06 PROCEDURE — OTHER PRESCRIPTION: OTHER

## 2025-02-06 PROCEDURE — OTHER PHOTO-DOCUMENTATION: OTHER

## 2025-02-06 PROCEDURE — 99213 OFFICE O/P EST LOW 20 MIN: CPT

## 2025-02-06 PROCEDURE — OTHER PRESCRIPTION MEDICATION MANAGEMENT: OTHER

## 2025-02-06 PROCEDURE — OTHER MIPS QUALITY: OTHER

## 2025-02-06 PROCEDURE — OTHER COUNSELING: OTHER

## 2025-02-06 RX ORDER — VALACYCLOVIR 1 G/1
1MG TABLET, FILM COATED ORAL BID
Qty: 24 | Refills: 1 | Status: ERX | COMMUNITY
Start: 2025-02-06

## 2025-02-06 ASSESSMENT — LOCATION DETAILED DESCRIPTION DERM: LOCATION DETAILED: INFERIOR LUMBAR SPINE

## 2025-02-06 ASSESSMENT — LOCATION ZONE DERM: LOCATION ZONE: TRUNK

## 2025-02-06 ASSESSMENT — LOCATION SIMPLE DESCRIPTION DERM: LOCATION SIMPLE: LOWER BACK

## 2025-02-06 NOTE — HPI: RASH
What Type Of Note Output Would You Prefer (Optional)?: Bullet Format
Is The Patient Presenting As Previously Scheduled?: No, they are a work-in
How Severe Is Your Rash?: moderate
Is This A New Presentation, Or A Follow-Up?: Rash
Additional History: The patient presents with concern of a rash to her lower back that has been present on and off for the past year and that flares occur every three months or so. She states she was seen for a telehealth visit at a primary care provider who suspected it was shingles.  When she was tested, the results came back negative.  She reports going to urgent care afterward when the pain continued, who then referred her to a dermatologist.

## 2025-02-06 NOTE — PROCEDURE: PRESCRIPTION MEDICATION MANAGEMENT
Dr. Hernandez
Detail Level: Zone
Initiate Treatment: valacyclovir 1 gram tablet BID
Render In Strict Bullet Format?: No

## 2025-05-21 ENCOUNTER — PATIENT OUTREACH (OUTPATIENT)
Dept: CARE COORDINATION | Facility: CLINIC | Age: 40
End: 2025-05-21
Payer: COMMERCIAL

## 2025-06-04 ENCOUNTER — PATIENT OUTREACH (OUTPATIENT)
Dept: CARE COORDINATION | Facility: CLINIC | Age: 40
End: 2025-06-04
Payer: COMMERCIAL

## 2025-08-02 ENCOUNTER — HEALTH MAINTENANCE LETTER (OUTPATIENT)
Age: 40
End: 2025-08-02

## 2025-08-26 ENCOUNTER — APPOINTMENT (OUTPATIENT)
Dept: URBAN - METROPOLITAN AREA CLINIC 254 | Age: 40
Setting detail: DERMATOLOGY
End: 2025-08-27

## 2025-08-26 DIAGNOSIS — B00.1 HERPESVIRAL VESICULAR DERMATITIS: ICD-10-CM

## 2025-08-26 PROCEDURE — OTHER ORDER TESTS: OTHER

## 2025-08-26 PROCEDURE — 36415 COLL VENOUS BLD VENIPUNCTURE: CPT

## 2025-08-26 PROCEDURE — 99213 OFFICE O/P EST LOW 20 MIN: CPT

## 2025-08-26 PROCEDURE — OTHER COUNSELING: OTHER

## 2025-08-26 PROCEDURE — OTHER PRESCRIPTION MEDICATION MANAGEMENT: OTHER

## 2025-08-26 PROCEDURE — OTHER VENIPUNCTURE: OTHER

## 2025-08-26 ASSESSMENT — LOCATION SIMPLE DESCRIPTION DERM
LOCATION SIMPLE: RIGHT UPPER ARM
LOCATION SIMPLE: RIGHT LOWER BACK

## 2025-08-26 ASSESSMENT — LOCATION DETAILED DESCRIPTION DERM
LOCATION DETAILED: RIGHT ANTECUBITAL SKIN
LOCATION DETAILED: RIGHT INFERIOR MEDIAL LOWER BACK

## 2025-08-26 ASSESSMENT — LOCATION ZONE DERM
LOCATION ZONE: TRUNK
LOCATION ZONE: ARM